# Patient Record
Sex: FEMALE | Race: WHITE | NOT HISPANIC OR LATINO | Employment: STUDENT | URBAN - METROPOLITAN AREA
[De-identification: names, ages, dates, MRNs, and addresses within clinical notes are randomized per-mention and may not be internally consistent; named-entity substitution may affect disease eponyms.]

---
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
## 2021-01-18 RX ORDER — LORATADINE 10 MG/1
CAPSULE, LIQUID FILLED ORAL
COMMUNITY

## 2021-01-19 ENCOUNTER — OFFICE VISIT (OUTPATIENT)
Dept: GASTROENTEROLOGY | Facility: CLINIC | Age: 21
End: 2021-01-19
Payer: COMMERCIAL

## 2021-01-19 VITALS
HEART RATE: 82 BPM | BODY MASS INDEX: 36.19 KG/M2 | WEIGHT: 212 LBS | SYSTOLIC BLOOD PRESSURE: 128 MMHG | HEIGHT: 64 IN | DIASTOLIC BLOOD PRESSURE: 80 MMHG

## 2021-01-19 DIAGNOSIS — R10.84 GENERALIZED ABDOMINAL PAIN: ICD-10-CM

## 2021-01-19 DIAGNOSIS — K92.1 HEMATOCHEZIA: ICD-10-CM

## 2021-01-19 DIAGNOSIS — K59.1 FUNCTIONAL DIARRHEA: Primary | ICD-10-CM

## 2021-01-19 PROCEDURE — 99204 OFFICE O/P NEW MOD 45 MIN: CPT | Performed by: INTERNAL MEDICINE

## 2021-01-19 NOTE — PROGRESS NOTES
Consultation - [facility]  Cricket Duong 2000 23 y o  female     ASSESSMENT @ PLAN:   She is a 71-year-old female with 4 months of bandlike periumbilical abdominal pain which is random in intermittent and relieved with a bowel movement with soft diarrhea with intermittent hematochezia  Her mother does have Crohn's disease  She had negative stool C diff and stool panel testing at [facility]  1 will do colonoscopy to investigate      Chief Complaint:  Hematochezia and diarrhea and abdominal pain    HPI:  She is a 71-year-old female with a multitude of GI issues over the last 4 months  Starting after Thanksgiving she started to have a bandlike abdominal pain in the middle of the abdomen across the umbilicus that was random in intermittent and relieved with a bowel movement  She reports that would come and go without rhyme or reason  She has tried to reduce the Creamer in her coffee and really did not make a difference  She reports concurrent to this she was having soft diarrhea 2 to 4 times a day with urgency and frequency  She reports that she would have blood in her stool intermittently  She said that it was on the toilet paper and it was bright red  She reports nausea  She reports that her mother does have Crohn's disease  She went to see her primary care doctor who did Clostridium difficile testing and stool testing which was all negative  I did review this in the [facility] everywhere  The patient has a long history of constipation so this is a big change for her and she did have lifelong GERD as a child  She does have obesity with BMI of 36 3  REVIEW OF SYSTEMS:     CONSTITUTIONAL: Denies any fever, chills, or rigors  Good appetite, and no recent weight loss  HEENT: No earache or tinnitus  Denies hearing loss or visual disturbances  CARDIOVASCULAR: No chest pain or palpitations     RESPIRATORY: Denies any cough, hemoptysis, shortness of breath or dyspnea on exertion  GASTROINTESTINAL: As noted in the History of Present Illness  GENITOURINARY: No problems with urination  Denies any hematuria or dysuria  NEUROLOGIC: No dizziness or vertigo, denies headaches  MUSCULOSKELETAL: Denies any muscle or joint pain  SKIN: Denies skin rashes or itching  ENDOCRINE: Denies excessive thirst  Denies intolerance to heat or cold  PSYCHOSOCIAL: Denies depression or anxiety  Denies any recent memory loss       Past Medical History:   Diagnosis Date    Obesity (BMI 35 0-39 9 without comorbidity)       Past Surgical History:   Procedure Laterality Date    NO PAST SURGERIES       Social History     Socioeconomic History    Marital status: Single     Spouse name: Not on file    Number of children: Not on file    Years of education: Not on file    Highest education level: Not on file   Occupational History    Not on file   Social Needs    Financial resource strain: Not on file    Food insecurity     Worry: Not on file     Inability: Not on file    Transportation needs     Medical: Not on file     Non-medical: Not on file   Tobacco Use    Smoking status: Never Smoker    Smokeless tobacco: Never Used   Substance and Sexual Activity    Alcohol use: No    Drug use: No    Sexual activity: Not on file   Lifestyle    Physical activity     Days per week: Not on file     Minutes per session: Not on file    Stress: Not on file   Relationships    Social connections     Talks on phone: Not on file     Gets together: Not on file     Attends Tenriism service: Not on file     Active member of club or organization: Not on file     Attends meetings of clubs or organizations: Not on file     Relationship status: Not on file    Intimate partner violence     Fear of current or ex partner: Not on file     Emotionally abused: Not on file     Physically abused: Not on file     Forced sexual activity: Not on file   Other Topics Concern    Not on file   Social History Narrative Immunizations are not recorded on the chart, but parent states child is up to date  Family History   Problem Relation Age of Onset   Tasia Maria Crohn's disease Mother     Asthma Mother     Asthma Father     Colon cancer Paternal Aunt      [place] (diagnostic), Apple, Kiwi extract, Efrem flavor, and Punica  Current Outpatient Medications   Medication Sig Dispense Refill    ibuprofen (MOTRIN) 200 mg tablet Take 200 mg by mouth every 6 (six) hours as needed for mild pain      Loratadine (Claritin) 10 MG CAPS Take by mouth       No current facility-administered medications for this visit  Blood pressure 128/80, pulse 82, height 5' 4" (1 626 m), weight 96 2 kg (212 lb)  PHYSICAL EXAM:     General Appearance:   Alert, cooperative, no distress, appears stated age    HEENT:   Normocephalic, atraumatic, anicteric      Neck:  Supple, symmetrical, trachea midline, no adenopathy;    thyroid: no enlargement/tenderness/nodules; no carotid  bruit or JVD    Lungs:   Clear to auscultation bilaterally; no rales, rhonchi or wheezing; respirations unlabored    Heart[de-identified]   S1 and S2 normal; regular rate and rhythm; no murmur, rub, or gallop     Abdomen:   Soft, non-tender, non-distended; normal bowel sounds; no masses, no organomegaly    Genitalia:   Deferred    Rectal:   Deferred    Extremities:  No cyanosis, clubbing or edema    Pulses:  2+ and symmetric all extremities    Skin:  Skin color, texture, turgor normal, no rashes or lesions    Lymph nodes:  No palpable cervical, axillary or inguinal lymphadenopathy        No results found for: WBC, HGB, HCT, MCV, PLT  No results found for: GLUCOSE, CALCIUM, NA, K, CO2, CL, BUN, CREATININE  No results found for: ALT, AST, GGT, ALKPHOS, BILITOT  No results found for: INR, PROTIME

## 2021-01-19 NOTE — H&P (VIEW-ONLY)
Consultation - [facility]  Deann Anguiano 2000 23 y o  female     ASSESSMENT @ PLAN:   She is a 74-year-old female with 4 months of bandlike periumbilical abdominal pain which is random in intermittent and relieved with a bowel movement with soft diarrhea with intermittent hematochezia  Her mother does have Crohn's disease  She had negative stool C diff and stool panel testing at [facility]  1 will do colonoscopy to investigate      Chief Complaint:  Hematochezia and diarrhea and abdominal pain    HPI:  She is a 74-year-old female with a multitude of GI issues over the last 4 months  Starting after Thanksgiving she started to have a bandlike abdominal pain in the middle of the abdomen across the umbilicus that was random in intermittent and relieved with a bowel movement  She reports that would come and go without rhyme or reason  She has tried to reduce the Creamer in her coffee and really did not make a difference  She reports concurrent to this she was having soft diarrhea 2 to 4 times a day with urgency and frequency  She reports that she would have blood in her stool intermittently  She said that it was on the toilet paper and it was bright red  She reports nausea  She reports that her mother does have Crohn's disease  She went to see her primary care doctor who did Clostridium difficile testing and stool testing which was all negative  I did review this in the [facility] everywhere  The patient has a long history of constipation so this is a big change for her and she did have lifelong GERD as a child  She does have obesity with BMI of 36 3  REVIEW OF SYSTEMS:     CONSTITUTIONAL: Denies any fever, chills, or rigors  Good appetite, and no recent weight loss  HEENT: No earache or tinnitus  Denies hearing loss or visual disturbances  CARDIOVASCULAR: No chest pain or palpitations     RESPIRATORY: Denies any cough, hemoptysis, shortness of breath or dyspnea on exertion  GASTROINTESTINAL: As noted in the History of Present Illness  GENITOURINARY: No problems with urination  Denies any hematuria or dysuria  NEUROLOGIC: No dizziness or vertigo, denies headaches  MUSCULOSKELETAL: Denies any muscle or joint pain  SKIN: Denies skin rashes or itching  ENDOCRINE: Denies excessive thirst  Denies intolerance to heat or cold  PSYCHOSOCIAL: Denies depression or anxiety  Denies any recent memory loss       Past Medical History:   Diagnosis Date    Obesity (BMI 35 0-39 9 without comorbidity)       Past Surgical History:   Procedure Laterality Date    NO PAST SURGERIES       Social History     Socioeconomic History    Marital status: Single     Spouse name: Not on file    Number of children: Not on file    Years of education: Not on file    Highest education level: Not on file   Occupational History    Not on file   Social Needs    Financial resource strain: Not on file    Food insecurity     Worry: Not on file     Inability: Not on file    Transportation needs     Medical: Not on file     Non-medical: Not on file   Tobacco Use    Smoking status: Never Smoker    Smokeless tobacco: Never Used   Substance and Sexual Activity    Alcohol use: No    Drug use: No    Sexual activity: Not on file   Lifestyle    Physical activity     Days per week: Not on file     Minutes per session: Not on file    Stress: Not on file   Relationships    Social connections     Talks on phone: Not on file     Gets together: Not on file     Attends [employer] service: Not on file     Active member of club or organization: Not on file     Attends meetings of clubs or organizations: Not on file     Relationship status: Not on file    Intimate partner violence     Fear of current or ex partner: Not on file     Emotionally abused: Not on file     Physically abused: Not on file     Forced sexual activity: Not on file   Other Topics Concern    Not on file   Social History Narrative Immunizations are not recorded on the chart, but parent states child is up to date  Family History   Problem Relation Age of Onset   Tasia Maria Crohn's disease Mother     Asthma Mother     Asthma Father     Colon cancer Paternal Aunt      [place] (diagnostic), Apple, Kiwi extract, Efrem flavor, and Punica  Current Outpatient Medications   Medication Sig Dispense Refill    ibuprofen (MOTRIN) 200 mg tablet Take 200 mg by mouth every 6 (six) hours as needed for mild pain      Loratadine (Claritin) 10 MG CAPS Take by mouth       No current facility-administered medications for this visit  Blood pressure 128/80, pulse 82, height 5' 4" (1 626 m), weight 96 2 kg (212 lb)  PHYSICAL EXAM:     General Appearance:   Alert, cooperative, no distress, appears stated age    HEENT:   Normocephalic, atraumatic, anicteric      Neck:  Supple, symmetrical, trachea midline, no adenopathy;    thyroid: no enlargement/tenderness/nodules; no carotid  bruit or JVD    Lungs:   Clear to auscultation bilaterally; no rales, rhonchi or wheezing; respirations unlabored    Heart[de-identified]   S1 and S2 normal; regular rate and rhythm; no murmur, rub, or gallop     Abdomen:   Soft, non-tender, non-distended; normal bowel sounds; no masses, no organomegaly    Genitalia:   Deferred    Rectal:   Deferred    Extremities:  No cyanosis, clubbing or edema    Pulses:  2+ and symmetric all extremities    Skin:  Skin color, texture, turgor normal, no rashes or lesions    Lymph nodes:  No palpable cervical, axillary or inguinal lymphadenopathy        No results found for: WBC, HGB, HCT, MCV, PLT  No results found for: GLUCOSE, CALCIUM, NA, K, CO2, CL, BUN, CREATININE  No results found for: ALT, AST, GGT, ALKPHOS, BILITOT  No results found for: INR, PROTIME

## 2021-01-26 ENCOUNTER — ANESTHESIA EVENT (OUTPATIENT)
Dept: GASTROENTEROLOGY | Facility: HOSPITAL | Age: 21
End: 2021-01-26

## 2021-01-27 ENCOUNTER — ANESTHESIA (OUTPATIENT)
Dept: GASTROENTEROLOGY | Facility: HOSPITAL | Age: 21
End: 2021-01-27

## 2021-01-27 ENCOUNTER — HOSPITAL ENCOUNTER (OUTPATIENT)
Dept: GASTROENTEROLOGY | Facility: HOSPITAL | Age: 21
Setting detail: OUTPATIENT SURGERY
Discharge: HOME | End: 2021-01-27
Attending: INTERNAL MEDICINE | Admitting: INTERNAL MEDICINE
Payer: COMMERCIAL

## 2021-01-27 VITALS — HEART RATE: 65 BPM

## 2021-01-27 VITALS
DIASTOLIC BLOOD PRESSURE: 59 MMHG | RESPIRATION RATE: 16 BRPM | BODY MASS INDEX: 35.76 KG/M2 | OXYGEN SATURATION: 99 % | TEMPERATURE: 97.2 F | HEIGHT: 64 IN | HEART RATE: 80 BPM | WEIGHT: 209.44 LBS | SYSTOLIC BLOOD PRESSURE: 117 MMHG

## 2021-01-27 DIAGNOSIS — K59.1 FUNCTIONAL DIARRHEA: ICD-10-CM

## 2021-01-27 DIAGNOSIS — R10.84 GENERALIZED ABDOMINAL PAIN: ICD-10-CM

## 2021-01-27 DIAGNOSIS — K92.1 HEMATOCHEZIA: ICD-10-CM

## 2021-01-27 LAB
EXT PREGNANCY TEST URINE: NEGATIVE
EXT. CONTROL: NORMAL

## 2021-01-27 PROCEDURE — 88342 IMHCHEM/IMCYTCHM 1ST ANTB: CPT | Performed by: PATHOLOGY

## 2021-01-27 PROCEDURE — 45380 COLONOSCOPY AND BIOPSY: CPT | Performed by: INTERNAL MEDICINE

## 2021-01-27 PROCEDURE — 81025 URINE PREGNANCY TEST: CPT | Performed by: ANESTHESIOLOGY

## 2021-01-27 PROCEDURE — 88305 TISSUE EXAM BY PATHOLOGIST: CPT | Performed by: PATHOLOGY

## 2021-01-27 RX ORDER — LIDOCAINE HYDROCHLORIDE 10 MG/ML
INJECTION, SOLUTION EPIDURAL; INFILTRATION; INTRACAUDAL; PERINEURAL AS NEEDED
Status: DISCONTINUED | OUTPATIENT
Start: 2021-01-27 | End: 2021-01-27

## 2021-01-27 RX ORDER — MESALAMINE 4 G/60ML
4 ENEMA RECTAL
Qty: 30 BOTTLE | Refills: 1 | Status: SHIPPED | OUTPATIENT
Start: 2021-01-27 | End: 2021-03-24

## 2021-01-27 RX ORDER — SODIUM CHLORIDE, SODIUM LACTATE, POTASSIUM CHLORIDE, CALCIUM CHLORIDE 600; 310; 30; 20 MG/100ML; MG/100ML; MG/100ML; MG/100ML
125 INJECTION, SOLUTION INTRAVENOUS CONTINUOUS
Status: DISCONTINUED | OUTPATIENT
Start: 2021-01-27 | End: 2021-01-31 | Stop reason: HOSPADM

## 2021-01-27 RX ORDER — PROPOFOL 10 MG/ML
INJECTION, EMULSION INTRAVENOUS AS NEEDED
Status: DISCONTINUED | OUTPATIENT
Start: 2021-01-27 | End: 2021-01-27

## 2021-01-27 RX ORDER — DICYCLOMINE HCL 20 MG
20 TABLET ORAL 3 TIMES DAILY PRN
Qty: 60 TABLET | Refills: 1 | Status: SHIPPED | OUTPATIENT
Start: 2021-01-27 | End: 2022-05-17

## 2021-01-27 RX ADMIN — PROPOFOL 150 MG: 10 INJECTION, EMULSION INTRAVENOUS at 10:42

## 2021-01-27 RX ADMIN — SODIUM CHLORIDE, SODIUM LACTATE, POTASSIUM CHLORIDE, AND CALCIUM CHLORIDE 125 ML/HR: .6; .31; .03; .02 INJECTION, SOLUTION INTRAVENOUS at 10:07

## 2021-01-27 RX ADMIN — PROPOFOL 50 MG: 10 INJECTION, EMULSION INTRAVENOUS at 10:46

## 2021-01-27 RX ADMIN — PROPOFOL 100 MG: 10 INJECTION, EMULSION INTRAVENOUS at 10:43

## 2021-01-27 RX ADMIN — LIDOCAINE HYDROCHLORIDE 50 MG: 10 INJECTION, SOLUTION EPIDURAL; INFILTRATION; INTRACAUDAL; PERINEURAL at 10:41

## 2021-01-27 NOTE — ANESTHESIA POSTPROCEDURE EVALUATION
Post-Op Assessment Note    CV Status:  Stable    Pain management: adequate     Mental Status:  Alert and awake   Hydration Status:  Euvolemic   PONV Controlled:  Controlled   Airway Patency:  Patent      Post Op Vitals Reviewed: Yes      Staff: CRNA         No complications documented      /67 (01/27/21 1054)    Temp (!) 97 2 °F (36 2 °C) (01/27/21 1054)    Pulse 78 (01/27/21 1054)   Resp 16 (01/27/21 1054)    SpO2 98 % (01/27/21 1054)

## 2021-01-27 NOTE — DISCHARGE INSTRUCTIONS
Colonoscopy   WHAT YOU NEED TO KNOW:   A colonoscopy is a procedure to examine the inside of your colon (intestine) with a scope  Polyps or tissue growths may have been removed during your colonoscopy  It is normal to feel bloated and to have some abdominal discomfort  You should be passing gas  If you have hemorrhoids or you had polyps removed, you may have a small amount of bleeding  DISCHARGE INSTRUCTIONS:   Call your doctor if:   · You have a large amount of bright red blood in your bowel movements  · Your abdomen is hard and firm and you have severe pain  · You have sudden trouble breathing  · You develop a rash or hives  · You have a fever within 24 hours of your procedure  · You have not had a bowel movement for 3 days after your procedure  · You have questions or concerns about your condition or care  After your colonoscopy:   · Do not lift, strain, or run  for 3 days  · Rest as much as possible  You have been given medicine to relax you  Do not  drive or make important decisions for at least 24 hours  Return to your normal activity as directed  · Relieve gas and discomfort from bloating  by lying on your left side with a heating pad on your abdomen  You may need to take short walks to help the gas move out  Eat small meals until bloating is relieved  If you had polyps removed: For 7 days after your procedure:  · Do not  take aspirin  · Do not  go on long car rides  Help prevent constipation:   · Eat a variety of healthy foods  Healthy foods include fruit, vegetables, whole-grain breads, low-fat dairy products, beans, lean meat, and fish  Ask if you need to be on a special diet  Your healthcare provider may recommend that you eat high-fiber foods such as cooked beans  Fiber helps you have regular bowel movements  · Drink liquids as directed  Adults should drink between 9 and 13 eight-ounce cups of liquid every day  Ask what amount is best for you   For most people, good liquids to drink are water, juice, and milk  · Exercise as directed  Talk to your healthcare provider about the best exercise plan for you  Exercise can help prevent constipation, decrease your blood pressure and improve your health  Follow up with your healthcare provider as directed:  Write down your questions so you remember to ask them during your visits  © Copyright [address] Information is for End User's use only and may not be sold, redistributed or otherwise used for commercial purposes  All illustrations and images included in CareNotes® are the copyrighted property of A D A M , Inc  or [address] [place], [place] [address]azeem Shahid   The above information is an  only  It is not intended as medical advice for individual conditions or treatments  Talk to your doctor, nurse or pharmacist before following any medical regimen to see if it is safe and effective for you

## 2021-01-27 NOTE — ANESTHESIA PREPROCEDURE EVALUATION
Procedure:  COLONOSCOPY    Relevant Problems   No relevant active problems        Physical Exam    Airway    Mallampati score: III  TM Distance: >3 FB       Dental       Cardiovascular  Cardiovascular exam normal    Pulmonary  Pulmonary exam normal     Other Findings      25-year-old female with a multitude of GI issues over the last 4 months  Starting after Thanksgiving she started to have a bandlike abdominal pain in the middle of the abdomen across the umbilicus that was random in intermittent and relieved with a bowel movement  She reports that would come and go without rhyme or reason  She has tried to reduce the Creamer in her coffee and really did not make a difference  She reports concurrent to this she was having soft diarrhea 2 to 4 times a day with urgency and frequency  She reports that she would have blood in her stool intermittently  She said that it was on the toilet paper and it was bright red  She reports nausea  She reports that her mother does have Crohn's disease  She went to see her primary care doctor who did Clostridium difficile testing and stool testing which was all negative  I did review this in the [facility] everywhere  The patient has a long history of constipation so this is a big change for her and she did have lifelong GERD as a child  She does have obesity with BMI of 36 3  Anesthesia Plan  ASA Score- 2     Anesthesia Type- IV sedation with anesthesia with ASA Monitors  Additional Monitors:   Airway Plan:           Plan Factors-Exercise tolerance (METS): >4 METS  Chart reviewed  EKG reviewed  Imaging results reviewed  Existing labs reviewed  Patient summary reviewed  Induction- intravenous  Postoperative Plan-     Informed Consent- Anesthetic plan and risks discussed with patient  I personally reviewed this patient with the CRNA  Discussed and agreed on the Anesthesia Plan with the VIRAL Gallego

## 2021-01-27 NOTE — INTERVAL H&P NOTE
H&P reviewed  After examining the patient I find no changes in the patients condition since the H&P had been written      Vitals:    01/27/21 0957   BP: 128/85   Pulse: 86   Resp: 18   Temp: 98 1 °F (36 7 °C)   SpO2: 99%

## 2021-01-28 ENCOUNTER — TELEPHONE (OUTPATIENT)
Dept: GASTROENTEROLOGY | Facility: CLINIC | Age: 21
End: 2021-01-28

## 2021-01-29 ENCOUNTER — TELEPHONE (OUTPATIENT)
Dept: GASTROENTEROLOGY | Facility: CLINIC | Age: 21
End: 2021-01-29

## 2021-01-29 NOTE — TELEPHONE ENCOUNTER
----- Message from Karsten Burris MD sent at 1/29/2021 11:17 AM EST -----  Please tell her the biopsy is consistent with mild ulcerative colitis

## 2021-02-23 ENCOUNTER — OFFICE VISIT (OUTPATIENT)
Dept: GASTROENTEROLOGY | Facility: CLINIC | Age: 21
End: 2021-02-23
Payer: COMMERCIAL

## 2021-02-23 VITALS
BODY MASS INDEX: 39.57 KG/M2 | DIASTOLIC BLOOD PRESSURE: 70 MMHG | HEIGHT: 64 IN | HEART RATE: 88 BPM | WEIGHT: 231.8 LBS | SYSTOLIC BLOOD PRESSURE: 116 MMHG

## 2021-02-23 DIAGNOSIS — K51.811 OTHER ULCERATIVE COLITIS WITH RECTAL BLEEDING (HCC): Primary | ICD-10-CM

## 2021-02-23 PROCEDURE — 99213 OFFICE O/P EST LOW 20 MIN: CPT | Performed by: PHYSICIAN ASSISTANT

## 2021-02-23 NOTE — PROGRESS NOTES
[facility] - Outpatient Follow-up Note  Ana Amin 23 y o  female MRN: [id number]  Encounter: [id number]          ASSESSMENT AND PLAN:      1  Ulcerative Colitis  - Diagnosed in January 2021 which showed mild ulcerative colitis in the descending and sigmoid colon, rectum appeared normal  - Symptoms have mostly resolved with 4 weeks of Rowasa enemas, continue for 2 more weeks  - We discussed the nature of UC and plan to monitor after she finishes the Rowasa enema course; no plan for maintenance medication currently but we discussed if she has recurrent symptoms in the near future we would likely need to start maintenance medications such as a oral mesalamine and topical mesalamine enema for maintenance  - Follow up in 2-3 months  ______________________________________________________________________    SUBJECTIVE:  Rob Centeno is a 24 yo F presenting for follow up after she had a colonoscopy In January to evaluate for several months of bandlike abdominal pain, diarrhea, and blood in the stool  She was found to have mild ulcerative colitis in the descending and sigmoid colon but the rectum appeared normal   Biopsies were consistent with ulcerative colitis and she was given Rowasa enemas today for 6 weeks  She has been doing this for almost a month and she feels significantly better  She is having 2-4 formed bowel movements daily without any blood in the stool  Her pain is mostly resolved  She is feeling really well and able to eat well  She is wondering if there are any dietary restrictions she should be following  REVIEW OF SYSTEMS IS OTHERWISE NEGATIVE        Historical Information   Past Medical History:   Diagnosis Date    Asthma     Obesity (BMI 35 0-39 9 without comorbidity)      Past Surgical History:   Procedure Laterality Date    NO PAST SURGERIES       Social History   Social History     Substance and Sexual Activity   Alcohol Use No     Social History     Substance and Sexual Activity   Drug Use No     Social History     Tobacco Use   Smoking Status Never Smoker   Smokeless Tobacco Never Used     Family History   Problem Relation Age of Onset    Crohn's disease Mother     Asthma Mother     Asthma Father     Colon cancer Paternal Aunt        Meds/Allergies       Current Outpatient Medications:     dicyclomine (BENTYL) 20 mg tablet    ibuprofen (MOTRIN) 200 mg tablet    Loratadine (Claritin) 10 MG CAPS    mesalamine (ROWASA) 4 g    Allergies   Allergen Reactions    [place] (Diagnostic) Itching     Itchy throat and ears,  Throat swelling   Apple Other (See Comments)     Fresh only    Kiwi Extract Other (See Comments)    Efrem Flavor Other (See Comments)    Punica Other (See Comments)           Objective     Blood pressure 116/70, pulse 88, height 5' 4" (1 626 m), weight 105 kg (231 lb 12 8 oz)  Body mass index is 39 79 kg/m²  PHYSICAL EXAM:      General Appearance:   Alert, cooperative, no distress   HEENT:   Normocephalic, atraumatic, anicteric      Neck:  Supple, symmetrical, trachea midline   Lungs:   Clear to auscultation bilaterally; no rales, rhonchi or wheezing; respirations unlabored    Heart[de-identified]   Regular rate and rhythm; no murmur, rub, or gallop  Abdomen:   Soft, non-tender, non-distended; normal bowel sounds; no masses, no organomegaly    Genitalia:   Deferred    Rectal:   Deferred    Extremities:  No cyanosis, clubbing or edema    Pulses:  2+ and symmetric    Skin:  No jaundice, rashes, or lesions    Lymph nodes:  No palpable cervical lymphadenopathy        Lab Results:   No visits with results within 1 Day(s) from this visit     Latest known visit with results is:   Hospital Outpatient Visit on 01/27/2021   Component Date Value    EXT Preg Test, Ur 01/27/2021 Negative     Control 01/27/2021 Valid     Case Report 01/27/2021                      Value:Surgical Pathology Report                         Case: [id number] Authorizing Provider:  Roosevelt Tian MD   Collected:           01/27/2021 1051              Ordering Location:      [facility]       Received:            01/27/2021 [facility]                                                             Pathologist:           Maryann Martinez MD                                                                 Specimen:    Colon, rectosigmoid                                                                        Addendum 01/27/2021                      Value: This result contains rich text formatting which cannot be displayed here   Final Diagnosis 01/27/2021                      Value: This result contains rich text formatting which cannot be displayed here   Additional Information 01/27/2021                      Value: This result contains rich text formatting which cannot be displayed here  [employer] Description 01/27/2021                      Value: This result contains rich text formatting which cannot be displayed here   Clinical Information 01/27/2021                      Value:Cold bx r/o colitis         Radiology Results:   No results found

## 2021-03-01 ENCOUNTER — TELEPHONE (OUTPATIENT)
Dept: GASTROENTEROLOGY | Facility: CLINIC | Age: 21
End: 2021-03-01

## 2021-03-01 NOTE — TELEPHONE ENCOUNTER
Evan Tan pt-  Patient has a question re the mesalamine     She thought it was to be prescribed for 6 weeks and what she received was for 8 weeks     Please phone [phone] to advise

## 2021-03-24 DIAGNOSIS — R10.84 GENERALIZED ABDOMINAL PAIN: ICD-10-CM

## 2021-03-24 RX ORDER — MESALAMINE 4 G/60ML
4 ENEMA RECTAL
Qty: 30 BOTTLE | Refills: 1 | Status: SHIPPED | OUTPATIENT
Start: 2021-03-24 | End: 2021-07-08

## 2021-04-09 ENCOUNTER — OFFICE VISIT (OUTPATIENT)
Dept: GASTROENTEROLOGY | Facility: CLINIC | Age: 21
End: 2021-04-09
Payer: COMMERCIAL

## 2021-04-09 VITALS
HEIGHT: 64 IN | BODY MASS INDEX: 35.34 KG/M2 | HEART RATE: 84 BPM | SYSTOLIC BLOOD PRESSURE: 134 MMHG | DIASTOLIC BLOOD PRESSURE: 82 MMHG | WEIGHT: 207 LBS

## 2021-04-09 DIAGNOSIS — K51.811 OTHER ULCERATIVE COLITIS WITH RECTAL BLEEDING (HCC): Primary | ICD-10-CM

## 2021-04-09 PROCEDURE — 99213 OFFICE O/P EST LOW 20 MIN: CPT | Performed by: PHYSICIAN ASSISTANT

## 2021-04-09 RX ORDER — ERGOCALCIFEROL 1.25 MG/1
CAPSULE ORAL
COMMUNITY
Start: 2021-03-24

## 2021-04-09 RX ORDER — FERROUS SULFATE 325(65) MG
1 TABLET ORAL
COMMUNITY
Start: 2021-03-24 | End: 2022-05-17

## 2021-04-09 RX ORDER — MESALAMINE 1.2 G/1
TABLET, DELAYED RELEASE ORAL
Qty: 120 TABLET | Refills: 2 | Status: SHIPPED | OUTPATIENT
Start: 2021-04-09 | End: 2021-08-12

## 2021-04-09 RX ORDER — ALBUTEROL SULFATE 90 UG/1
AEROSOL, METERED RESPIRATORY (INHALATION)
COMMUNITY
Start: 2021-04-05

## 2021-04-09 NOTE — PROGRESS NOTES
[facility] - Outpatient Follow-up Note  Guillermo Lockhart 23 y o  female MRN: [id number]  Encounter: [id number]          ASSESSMENT AND PLAN:      1  Ulcerative Colitis  - Diagnosed in January during colonoscopy which showed mild ulcerative colitis in the sigmoid and descending colon  - She responded well to Rowasa enemas for 8-10 weeks with a reduction in stool count, resolution of blood present in the stool, and resolution of abdominal cramping but after stopping the Rowasa enemas her symptoms quickly returned  - Will start Lialda 4 8 g daily for 8 weeks then decrease to 2 4 g daily as maintenance  - Discussed that she may need Rowasa enema courses intermittently  - We discussed that if she does not respond well to mesalamine maintenance medications we may need to consider step up therapy but we would like to avoid this if her symptoms can be controlled with mesalamine agents    Follow up in 2-3 months  Call if needed with any issues prior especially if not responding to Lialda     ______________________________________________________________________    SUBJECTIVE:  Tamika العراقي is a 24 yo F presenting for follow-up of ulcerative colitis diagnosed in January  She was having diarrhea and bloody bowel movements as well as abdominal pain  Her colonoscopy showed mild ulcerative colitis in the descending and sigmoid colon within normal rectum  At her last visit she was doing very well on the Rowasa enemas about a month into them with 2 bowel movements daily, resolution of blood in the stool, and no abdominal pain  She stopped the enemas about a week ago reports that her stool count of to 4 5 in the or loose and there was some blood present  She is also getting abdominal cramping  She denies any fevers except for a fever yesterday which was after her 2nd COVID vaccine  REVIEW OF SYSTEMS IS OTHERWISE NEGATIVE        Historical Information   Past Medical History:   Diagnosis Date    Asthma  Obesity (BMI 35 0-39 9 without comorbidity)      Past Surgical History:   Procedure Laterality Date    NO PAST SURGERIES       Social History   Social History     Substance and Sexual Activity   Alcohol Use No     Social History     Substance and Sexual Activity   Drug Use No     Social History     Tobacco Use   Smoking Status Never Smoker   Smokeless Tobacco Never Used     Family History   Problem Relation Age of Onset    Crohn's disease Mother     Asthma Mother     Asthma Father     Colon cancer Paternal Aunt        Meds/Allergies       Current Outpatient Medications:     albuterol (PROVENTIL HFA,VENTOLIN HFA) 90 mcg/act inhaler    dicyclomine (BENTYL) 20 mg tablet    ergocalciferol (VITAMIN D2) 50,000 units    ferrous sulfate 325 (65 Fe) mg tablet    ibuprofen (MOTRIN) 200 mg tablet    Loratadine (Claritin) 10 MG CAPS    mesalamine (LIALDA) 1 2 g EC tablet    mesalamine (ROWASA) 4 g    Allergies   Allergen Reactions    [place] (Diagnostic) - Food Allergy Itching     Itchy throat and ears,  Throat swelling   Apple - Food Allergy Other (See Comments)     Fresh only    Kiwi Extract - Food Allergy Other (See Comments)    Efrem Flavor - Food Allergy Other (See Comments)    [place] Other (See Comments)           Objective     Blood pressure 134/82, pulse 84, height 5' 4" (1 626 m), weight 93 9 kg (207 lb)  Body mass index is 35 53 kg/m²  PHYSICAL EXAM:      General Appearance:   Alert, cooperative, no distress   HEENT:   Normocephalic, atraumatic, anicteric      Neck:  Supple, symmetrical, trachea midline   Lungs:   Clear to auscultation bilaterally; no rales, rhonchi or wheezing; respirations unlabored    Heart[de-identified]   Regular rate and rhythm; no murmur, rub, or gallop     Abdomen:   Soft, non-tender, non-distended; normal bowel sounds; no masses, no organomegaly    Genitalia:   Deferred    Rectal:   Deferred    Extremities:  No cyanosis, clubbing or edema    Pulses:  2+ and symmetric    Skin:  No jaundice, rashes, or lesions    Lymph nodes:  No palpable cervical lymphadenopathy        Lab Results:   No visits with results within 1 Day(s) from this visit  Latest known visit with results is:   Hospital Outpatient Visit on 01/27/2021   Component Date Value    EXT Preg Test, Ur 01/27/2021 Negative     Control 01/27/2021 Valid     Case Report 01/27/2021                      Value:Surgical Pathology Report                         Case: [id number]                                   Authorizing Provider:  Jonathan Joseph MD   Collected:           01/27/2021 1051              Ordering Location:      [facility]       Received:            01/27/2021 [facility]                                                             Pathologist:           Mandi Ivory MD                                                                 Specimen:    Colon, rectosigmoid                                                                        Addendum 01/27/2021                      Value: This result contains rich text formatting which cannot be displayed here   Final Diagnosis 01/27/2021                      Value: This result contains rich text formatting which cannot be displayed here   Additional Information 01/27/2021                      Value: This result contains rich text formatting which cannot be displayed here  [facility] Gross Description 01/27/2021                      Value: This result contains rich text formatting which cannot be displayed here   Clinical Information 01/27/2021                      Value:Cold bx r/o colitis         Radiology Results:   No results found

## 2021-07-08 ENCOUNTER — OFFICE VISIT (OUTPATIENT)
Dept: GASTROENTEROLOGY | Facility: CLINIC | Age: 21
End: 2021-07-08
Payer: COMMERCIAL

## 2021-07-08 VITALS
SYSTOLIC BLOOD PRESSURE: 128 MMHG | HEART RATE: 86 BPM | DIASTOLIC BLOOD PRESSURE: 74 MMHG | HEIGHT: 64 IN | WEIGHT: 209 LBS | BODY MASS INDEX: 35.68 KG/M2

## 2021-07-08 DIAGNOSIS — K51.80 OTHER ULCERATIVE COLITIS WITHOUT COMPLICATION (HCC): Primary | ICD-10-CM

## 2021-07-08 PROBLEM — K51.90 ULCERATIVE COLITIS (HCC): Status: ACTIVE | Noted: 2021-07-08

## 2021-07-08 PROCEDURE — 99213 OFFICE O/P EST LOW 20 MIN: CPT | Performed by: PHYSICIAN ASSISTANT

## 2021-07-08 NOTE — PROGRESS NOTES
[facility] - Outpatient Follow-up Note  Robert San 24 y o  female MRN: [id number]  Encounter: [id number]          ASSESSMENT AND PLAN:      1  Other ulcerative colitis without complication ([facility] )  - She had mild left sided UC diagnosed in January  - Colonoscopy in January 2021 showed mild UC in the sigmoid and descending colon  - She is in remission with 2-3 formed BMS daily, no abdominal pain, and no blood in the stool using 4 8 g Lialda daily; we will drop her to 2 4 g daily as the maintenance dose and see how she does with this   - Follow up in 4-6 months  - She will call if her symptoms start to worsen with dropping the dose of Lialda and we will increase her to 3 6 g or 4 8 g daily    ______________________________________________________________________    SUBJECTIVE:  Joan Charles is a pleasant 25 yo F with a history of L sided UC diagnosed in January 2021, presenting for routine follow up  At her last visit in April she finished a course of Rowasa enemas and was starting to having worsening symptoms with up to 5-6 episodes of diarrhea daily associated with blood at times  We put her on Lialda 4 8 g daily and she reports she is doing really well with this  She is having 2-3 formed BMs daily without any blood in the stool or abdominal pain  She will occasionally have abdominal cramping associated with dairy products or more frequent bowel movements when she has coffee  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  REVIEW OF SYSTEMS IS OTHERWISE NEGATIVE        Historical Information   Past Medical History:   Diagnosis Date    Asthma     Obesity (BMI 35 0-39 9 without comorbidity)     Ulcerative colitis ([facility] )      Past Surgical History:   Procedure Laterality Date    NO PAST SURGERIES       Social History   Social History     Substance and Sexual Activity   Alcohol Use No     Social History     Substance and Sexual Activity   Drug Use No     Social History     Tobacco Use   Smoking Status Never Smoker   Smokeless Tobacco Never Used     Family History   Problem Relation Age of Onset    Crohn's disease Mother     Asthma Mother     Asthma Father     Colon cancer Paternal Aunt        Meds/Allergies       Current Outpatient Medications:     albuterol (PROVENTIL HFA,VENTOLIN HFA) 90 mcg/act inhaler    dicyclomine (BENTYL) 20 mg tablet    ergocalciferol (VITAMIN D2) 50,000 units    ferrous sulfate 325 (65 Fe) mg tablet    ibuprofen (MOTRIN) 200 mg tablet    Loratadine (Claritin) 10 MG CAPS    mesalamine (LIALDA) 1 2 g EC tablet    Allergies   Allergen Reactions    [place] (Diagnostic) - Food Allergy Itching     Itchy throat and ears,  Throat swelling   Apple - Food Allergy Other (See Comments)     Fresh only    Kiwi Extract - Food Allergy Other (See Comments)    [place] Flavor - Food Allergy Other (See Comments)    Pun[place] Other (See Comments)           Objective     Blood pressure 128/74, pulse 86, height 5' 4" (1 626 m), weight 94 8 kg (209 lb)  Body mass index is 35 87 kg/m²  PHYSICAL EXAM:      General Appearance:   Alert, cooperative, no distress   HEENT:   Normocephalic, atraumatic, anicteric      Neck:  Supple, symmetrical, trachea midline   Lungs:   Clear to auscultation bilaterally; no rales, rhonchi or wheezing; respirations unlabored    Heart[de-identified]   Regular rate and rhythm; no murmur, rub, or gallop  Abdomen:   Soft, non-tender, non-distended; normal bowel sounds; no masses, no organomegaly    Genitalia:   Deferred    Rectal:   Deferred    Extremities:  No cyanosis, clubbing or edema    Pulses:  2+ and symmetric    Skin:  No jaundice, rashes, or lesions    Lymph nodes:  No palpable cervical lymphadenopathy        Lab Results:   No visits with results within 1 Day(s) from this visit     Latest known visit with results is:   Hospital Outpatient Visit on 01/27/2021   Component Date Value    EXT Preg Test, Ur 01/27/2021 Negative     Control 01/27/2021 Valid     Case Report 01/27/2021                      Value:Surgical Pathology Report                         Case: [id number]                                   Authorizing Provider:  Jeaneth Wilson MD   Collected:           01/27/2021 1051              Ordering Location:      [facility]       Received:            01/27/2021 [facility]                                                             Pathologist:           Marzette Paget, MD                                                                 Specimen:    Colon, rectosigmoid                                                                        Addendum 01/27/2021                      Value: This result contains rich text formatting which cannot be displayed here   Final Diagnosis 01/27/2021                      Value: This result contains rich text formatting which cannot be displayed here   Additional Information 01/27/2021                      Value: This result contains rich text formatting which cannot be displayed here  [facility] Gross Description 01/27/2021                      Value: This result contains rich text formatting which cannot be displayed here   Clinical Information 01/27/2021                      Value:Cold bx r/o colitis         Radiology Results:   No results found

## 2021-10-08 ENCOUNTER — TELEPHONE (OUTPATIENT)
Dept: GASTROENTEROLOGY | Facility: CLINIC | Age: 21
End: 2021-10-08

## 2022-04-13 DIAGNOSIS — K51.811 OTHER ULCERATIVE COLITIS WITH RECTAL BLEEDING (HCC): Primary | ICD-10-CM

## 2022-04-13 RX ORDER — MESALAMINE 1.2 G/1
2400 TABLET, DELAYED RELEASE ORAL DAILY
Qty: 180 TABLET | Refills: 0 | Status: SHIPPED | OUTPATIENT
Start: 2022-04-13 | End: 2022-05-10

## 2022-04-13 NOTE — TELEPHONE ENCOUNTER
Spoke with patient  She is requesting a refill and has not made it to a follow-up visit after her reduced dose of her mesalamine  Scheduled OV  Please refill mesalamine

## 2022-05-08 DIAGNOSIS — K51.811 OTHER ULCERATIVE COLITIS WITH RECTAL BLEEDING (HCC): ICD-10-CM

## 2022-05-09 NOTE — TELEPHONE ENCOUNTER
Medication failed [facility] protocol  Please forward to your office staff for further review as this medication was reviewed by a [facility] RN        Routing to pa  I do not understand what this means via pt medication request for mesalamine since it was prescribed and recvd via e script to pharmacy

## 2022-05-10 RX ORDER — MESALAMINE 1.2 G/1
TABLET, DELAYED RELEASE ORAL
Qty: 100 TABLET | Refills: 1 | Status: SHIPPED | OUTPATIENT
Start: 2022-05-10

## 2022-05-17 ENCOUNTER — OFFICE VISIT (OUTPATIENT)
Dept: GASTROENTEROLOGY | Facility: CLINIC | Age: 22
End: 2022-05-17
Payer: COMMERCIAL

## 2022-05-17 VITALS
SYSTOLIC BLOOD PRESSURE: 118 MMHG | BODY MASS INDEX: 36.33 KG/M2 | DIASTOLIC BLOOD PRESSURE: 82 MMHG | HEART RATE: 72 BPM | WEIGHT: 212.8 LBS | HEIGHT: 64 IN

## 2022-05-17 DIAGNOSIS — K51.80 OTHER ULCERATIVE COLITIS WITHOUT COMPLICATION (HCC): Primary | ICD-10-CM

## 2022-05-17 PROCEDURE — 99213 OFFICE O/P EST LOW 20 MIN: CPT | Performed by: PHYSICIAN ASSISTANT

## 2022-05-17 RX ORDER — DIPHENOXYLATE HYDROCHLORIDE AND ATROPINE SULFATE 2.5; .025 MG/1; MG/1
1 TABLET ORAL DAILY
COMMUNITY

## 2022-05-17 NOTE — PROGRESS NOTES
[facility] - Outpatient Follow-up Note  Storm Green 20 y o  female MRN: [id number]  Encounter: [id number]          ASSESSMENT AND PLAN:      1  Other ulcerative colitis without complication ([facility] )  - She has mild ulcerative colitis in the sigmoid and descending colon diagnosed in 2021 who is in remission and on Lialda 2 4 g daily  - Recommend next colonoscopy in 2029, 8 years from initial diagnosis, unless she has any major changes in the meantime  - Continue Lialda 2 4 g daily  - Yearly follow up or call sooner with any problems    ______________________________________________________________________    SUBJECTIVE:  Bobbi Lombard is a pleasant 21 yo F   Presenting for routine follow-up of her ulcerative colitis  She was diagnosed in 2021 after she was having diarrhea and blood in the stool and was found to have mild colitis in the descending and sigmoid colon  She likely has done really well on Lialda and is currently in remission on Lialda 2 tablets daily  She reports having 2-3 formed bowel movements daily without any blood in the stool and rarely has diarrhea  She will occasionally have lower abdominal discomfort prior to a bowel movement but then relieved by having a bowel movement  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       REVIEW OF SYSTEMS IS OTHERWISE NEGATIVE        Historical Information   Past Medical History:   Diagnosis Date    Asthma     Obesity (BMI 35 0-39 9 without comorbidity)     Ulcerative colitis ([facility] )      Past Surgical History:   Procedure Laterality Date    NO PAST SURGERIES       Social History   Social History     Substance and Sexual Activity   Alcohol Use Yes    Comment: occasioanlly     Social History     Substance and Sexual Activity   Drug Use No     Social History     Tobacco Use   Smoking Status Never Smoker   Smokeless Tobacco Never Used     Family History   Problem Relation Age of Onset    Crohn's disease Mother  Asthma Mother     Asthma Father     Colon cancer Paternal Aunt        Meds/Allergies       Current Outpatient Medications:     albuterol (PROVENTIL HFA,VENTOLIN HFA) 90 mcg/act inhaler    ergocalciferol (VITAMIN D2) 50,000 units    ibuprofen (MOTRIN) 200 mg tablet    Loratadine 10 MG CAPS    mesalamine (LIALDA) 1 2 g EC tablet    multivitamin (THERAGRAN) TABS    Allergies   Allergen Reactions    [place] (Diagnostic) - Food Allergy Itching     Itchy throat and ears,  Throat swelling   Apple - Food Allergy Other (See Comments)     Fresh only    Kiwi Extract - Food Allergy Other (See Comments)    Ef[place] Flavor - Food Allergy Other (See Comments)    [place] Other (See Comments)    Pollen Extract Wheezing           Objective     Blood pressure 118/82, pulse 72, height 5' 4" (1 626 m), weight 96 5 kg (212 lb 12 8 oz)  Body mass index is 36 53 kg/m²  PHYSICAL EXAM:      General Appearance:   Alert, cooperative, no distress   HEENT:   Normocephalic, atraumatic, anicteric      Neck:  Supple, symmetrical, trachea midline   Lungs:   Clear to auscultation bilaterally; no rales, rhonchi or wheezing; respirations unlabored    Heart[de-identified]   Regular rate and rhythm; no murmur, rub, or gallop  Abdomen:   Soft, non-tender, non-distended; normal bowel sounds; no masses, no organomegaly    Genitalia:   Deferred    Rectal:   Deferred    Extremities:  No cyanosis, clubbing or edema    Pulses:  2+ and symmetric    Skin:  No jaundice, rashes, or lesions    Lymph nodes:  No palpable cervical lymphadenopathy        Lab Results:   No visits with results within 1 Day(s) from this visit     Latest known visit with results is:   Hospital Outpatient Visit on 01/27/2021   Component Date Value    EXT Preg Test, Ur 01/27/2021 Negative     Control 01/27/2021 Valid     Case Report 01/27/2021                      Value:Surgical Pathology Report                         Case: [id number]                                   Authorizing Provider:  Oliver Chavez MD   Collected:           01/27/2021 1051              Ordering Location:      [facility]       Received:            01/27/2021 [facility]                                                             Pathologist:           Marcio Kapoor MD                                                                 Specimen:    Colon, rectosigmoid                                                                        Addendum 01/27/2021                      Value: This result contains rich text formatting which cannot be displayed here   Final Diagnosis 01/27/2021                      Value: This result contains rich text formatting which cannot be displayed here   Additional Information 01/27/2021                      Value: This result contains rich text formatting which cannot be displayed here  [facility] Description 01/27/2021                      Value: This result contains rich text formatting which cannot be displayed here   Clinical Information 01/27/2021                      Value:Cold bx r/o colitis         Radiology Results:   No results found

## 2022-11-19 DIAGNOSIS — K51.811 OTHER ULCERATIVE COLITIS WITH RECTAL BLEEDING (HCC): ICD-10-CM

## 2022-11-19 RX ORDER — MESALAMINE 1.2 G/1
TABLET, DELAYED RELEASE ORAL
Qty: 100 TABLET | Refills: 1 | Status: SHIPPED | OUTPATIENT
Start: 2022-11-19

## 2023-02-07 ENCOUNTER — OFFICE VISIT (OUTPATIENT)
Dept: URGENT CARE | Facility: CLINIC | Age: 23
End: 2023-02-07

## 2023-02-07 VITALS
WEIGHT: 208.5 LBS | BODY MASS INDEX: 35.79 KG/M2 | DIASTOLIC BLOOD PRESSURE: 90 MMHG | RESPIRATION RATE: 14 BRPM | HEART RATE: 109 BPM | OXYGEN SATURATION: 100 % | SYSTOLIC BLOOD PRESSURE: 128 MMHG | TEMPERATURE: 97.6 F

## 2023-02-07 DIAGNOSIS — J02.9 ACUTE PHARYNGITIS, UNSPECIFIED ETIOLOGY: ICD-10-CM

## 2023-02-07 DIAGNOSIS — B97.89 VIRAL SINUSITIS: Primary | ICD-10-CM

## 2023-02-07 DIAGNOSIS — J32.9 VIRAL SINUSITIS: Primary | ICD-10-CM

## 2023-02-07 PROBLEM — E66.9 OBESITY (BMI 30.0-34.9): Status: ACTIVE | Noted: 2022-03-23

## 2023-02-07 PROBLEM — E66.811 OBESITY (BMI 30.0-34.9): Status: ACTIVE | Noted: 2022-03-23

## 2023-02-07 PROBLEM — J45.20 MILD INTERMITTENT ASTHMA WITHOUT COMPLICATION: Status: ACTIVE | Noted: 2019-01-08

## 2023-02-07 LAB — S PYO AG THROAT QL: NEGATIVE

## 2023-02-07 NOTE — PROGRESS NOTES
[facility] [facility]        NAME: Bhumi Guevara is a 20 y o  female  : 2000    MRN: [id number]  DATE: 2023  TIME: 2:47 PM    Assessment and Plan   Viral sinusitis [J32 9, B97 89]  1  Viral sinusitis        2  Acute pharyngitis, unspecified etiology            Symptoms consistent with viral illness  POC strep is negative will sent for culture  Given education on supportive measures for symptoms in discharge instructions  Follow up with primary care in 3-5 days  Go to ER if symptoms get worse  Patient Instructions     Only 0 5-2% of all infections are bacterial, the rest are viral  These resolve in 7-10 days  Continue supportive care for symptoms such as saline nasal solution or flonase nasal spray to reduce congestion  Warm compresses can be applied to your face to reduce pain  Tylenol and NSAID medications may also be taken  Your throat swab will be sent for culture  --You should contact your primary care provider and/or go to the ER if your symptoms are not improved or get worse over the next 7 days  This includes new onset fever, localized ear pain, severe sinus pain, as well as worsening cough, chest pain, shortness of breath, or significant weakness/fatigue  Chief Complaint     Chief Complaint   Patient presents with   • Sinusitis     Started yesterday, sinus pressure getting worse  Post nasal drip, runny/stuffy nose  Slight cough  No fever  Taking allergy meds  Declines PCR at this time  History of Present Illness       Presents with sick symptoms including post nasal drip, sinus pressure runny/stuffy nose, and slight cough that began yesterday  Denies fever symptoms  Review of Systems   Review of Systems   Constitutional: Negative for chills, fatigue and fever  HENT: Positive for congestion, sinus pressure and sore throat  Respiratory: Positive for cough  Negative for shortness of breath and wheezing  Cardiovascular: Negative for chest pain  Gastrointestinal: Negative for abdominal pain  Genitourinary: Negative for dysuria  Musculoskeletal: Negative for myalgias  Neurological: Negative for dizziness  Psychiatric/Behavioral: Negative for confusion           Current Medications       Current Outpatient Medications:   •  albuterol (PROVENTIL HFA,VENTOLIN HFA) 90 mcg/act inhaler, , Disp: , Rfl:   •  ibuprofen (MOTRIN) 200 mg tablet, Take 200 mg by mouth every 6 (six) hours as needed for mild pain, Disp: , Rfl:   •  Loratadine 10 MG CAPS, Take by mouth, Disp: , Rfl:   •  mesalamine (LIALDA) 1 2 g EC tablet, TAKE 2 TABLETS BY MOUTH DAILY TAKE 4 TABLETS ONCE DAILY FOR 8 WEEKS THEN DECREASE TO 2 TABLETS DAILY, Disp: 100 tablet, Rfl: 1  •  multivitamin (THERAGRAN) TABS, Take 1 tablet by mouth in the morning , Disp: , Rfl:   •  ergocalciferol (VITAMIN D2) 50,000 units, TAKE 1 CAPSULE BY MOUTH ONE TIME PER WEEK (Patient not taking: Reported on 2/7/2023), Disp: , Rfl:     Current Allergies     Allergies as of 02/07/2023 - Reviewed 02/07/2023   Allergen Reaction Noted   • [place] (diagnostic) - food allergy Itching 07/25/2018   • Apple - food allergy Other (See Comments) 07/25/2018   • Kiwi extract - food allergy Other (See Comments) 07/25/2018   • [place] flavor - food allergy Other (See Comments) 07/25/2018   • [place] Other (See Comments) 07/25/2018   • Pollen extract Wheezing 03/23/2022            The following portions of the patient's history were reviewed and updated as appropriate: allergies, current medications, past family history, past medical history, past social history, past surgical history and problem list      Past Medical History:   Diagnosis Date   • Asthma    • Obesity (BMI 35 0-39 9 without comorbidity)    • Ulcerative colitis ([facility] )        Past Surgical History:   Procedure Laterality Date   • NO PAST SURGERIES         Family History   Problem Relation Age of Onset   • Crohn's disease Mother    • Asthma Mother    • Asthma Father    • Colon cancer Paternal Aunt          Medications have been verified  Objective   /90   Pulse (!) 109   Temp 97 6 °F (36 4 °C)   Resp 14   Wt 94 6 kg (208 lb 8 oz)   SpO2 100%   BMI 35 79 kg/m²        Physical Exam     Physical Exam  Vitals reviewed  Constitutional:       General: She is not in acute distress  Appearance: Normal appearance  HENT:      Right Ear: Tympanic membrane, ear canal and external ear normal       Left Ear: Tympanic membrane, ear canal and external ear normal       Nose: Nose normal       Mouth/Throat:      Mouth: Mucous membranes are moist       Pharynx: No posterior oropharyngeal erythema  Eyes:      Conjunctiva/sclera: Conjunctivae normal    Cardiovascular:      Rate and Rhythm: Normal rate and regular rhythm  Pulses: Normal pulses  Heart sounds: Normal heart sounds  No murmur heard  Pulmonary:      Effort: Pulmonary effort is normal  No respiratory distress  Breath sounds: Normal breath sounds  Skin:     General: Skin is warm and dry  Neurological:      General: No focal deficit present  Mental Status: She is alert and oriented to person, place, and time     Psychiatric:         Mood and Affect: Mood normal          Behavior: Behavior normal

## 2023-02-07 NOTE — PATIENT INSTRUCTIONS
Only 0 5-2% of all infections are bacterial, the rest are viral  These resolve in 7-10 days  Continue supportive care for symptoms such as saline nasal solution or flonase nasal spray to reduce congestion  Warm compresses can be applied to your face to reduce pain  Tylenol and NSAID medications may also be taken  Your throat swab will be sent for culture  --You should contact your primary care provider and/or go to the ER if your symptoms are not improved or get worse over the next 7 days  This includes new onset fever, localized ear pain, severe sinus pain, as well as worsening cough, chest pain, shortness of breath, or significant weakness/fatigue

## 2023-02-09 ENCOUNTER — TELEPHONE (OUTPATIENT)
Dept: URGENT CARE | Facility: CLINIC | Age: 23
End: 2023-02-09

## 2023-02-09 DIAGNOSIS — J02.0 STREP THROAT: Primary | ICD-10-CM

## 2023-02-09 LAB — BACTERIA THROAT CULT: ABNORMAL

## 2023-02-09 RX ORDER — AMOXICILLIN 500 MG/1
500 CAPSULE ORAL EVERY 12 HOURS SCHEDULED
Qty: 20 CAPSULE | Refills: 0 | Status: SHIPPED | OUTPATIENT
Start: 2023-02-09 | End: 2023-02-19

## 2023-02-10 NOTE — RESULT ENCOUNTER NOTE
Your throat culture grew a strep C  This strep will go away on its own and does not cause complication  If your sore throat symptoms are gone, no need for additional intervention  If you still have symptoms, give the office a call [phone] and we can discuss medication options  I hope you are feeling better!

## 2023-05-02 ENCOUNTER — RA CDI HCC (OUTPATIENT)
Dept: OTHER | Facility: HOSPITAL | Age: 23
End: 2023-05-02

## 2023-05-02 NOTE — PROGRESS NOTES
[facility]  coding opportunities          Chart Reviewed number of suggestions sent to Provider: 1  [phone]     Patients Insurance        Commercial Insurance: [employer]

## 2023-05-10 ENCOUNTER — APPOINTMENT (OUTPATIENT)
Dept: LAB | Facility: HOSPITAL | Age: 23
End: 2023-05-10
Attending: INTERNAL MEDICINE

## 2023-05-10 ENCOUNTER — OFFICE VISIT (OUTPATIENT)
Dept: GASTROENTEROLOGY | Facility: CLINIC | Age: 23
End: 2023-05-10

## 2023-05-10 VITALS
SYSTOLIC BLOOD PRESSURE: 127 MMHG | DIASTOLIC BLOOD PRESSURE: 84 MMHG | OXYGEN SATURATION: 100 % | HEIGHT: 64 IN | BODY MASS INDEX: 35.51 KG/M2 | WEIGHT: 208 LBS | HEART RATE: 68 BPM

## 2023-05-10 DIAGNOSIS — K51.811 OTHER ULCERATIVE COLITIS WITH RECTAL BLEEDING (HCC): Primary | ICD-10-CM

## 2023-05-10 DIAGNOSIS — K51.811 OTHER ULCERATIVE COLITIS WITH RECTAL BLEEDING (HCC): ICD-10-CM

## 2023-05-10 LAB
CRP SERPL QL: 4.9 MG/L
ERYTHROCYTE [SEDIMENTATION RATE] IN BLOOD: 56 MM/HOUR (ref 0–19)

## 2023-05-10 RX ORDER — PREDNISONE 10 MG/1
TABLET ORAL
Qty: 70 TABLET | Refills: 0 | Status: SHIPPED | OUTPATIENT
Start: 2023-05-10 | End: 2023-06-07

## 2023-05-10 RX ORDER — MESALAMINE 1.2 G/1
1200 TABLET, DELAYED RELEASE ORAL 4 TIMES DAILY
Qty: 124 TABLET | Refills: 3 | Status: SHIPPED | OUTPATIENT
Start: 2023-05-10

## 2023-05-10 NOTE — PROGRESS NOTES
[facility] - Outpatient Follow-up Note  Freda Gonzalez 24 y o  female MRN: [id number]  Encounter: [id number]          ASSESSMENT AND PLAN:      1  Other ulcerative colitis with rectal bleeding (HCC)  - Sedimentation rate, automated; Future  - C-reactive protein; Future  - mesalamine (LIALDA) 1 2 g EC tablet; Take 1 tablet (1 2 g total) by mouth 4 (four) times a day  Dispense: 124 tablet; Refill: 3  - predniSONE 10 mg tablet; Take 4 tablets (40 mg total) by mouth daily for 7 days, THEN 3 tablets (30 mg total) daily for 7 days, THEN 2 tablets (20 mg total) daily for 7 days, THEN 1 tablet (10 mg total) daily for 7 days  Dispense: 70 tablet; Refill: 0    Schedule colonoscopy    F/u one month with Stefania    ______________________________________________________________________    SUBJECTIVE: Coby comes to the office today for evaluation of a flare of her ulcerative colitis  She has been experiencing episodes of abdominal pain associated with diarrhea  The abdominal pain occurs when she has the urge to go and the abdominal pain resolves with a diarrhea episode  She is having up to 2-3 episodes of diarrhea mostly with bleeding per day  The symptoms have been noticeable for the for the past month  She has been taking Lialda 1 2 g 2 tablets in the morning time  She states that since there were times when she was not always taking this medication but she is now taking it routinely  Her last colonoscopy was performed January 27, 2021 by Dr Judy Adames and it demonstrated left-sided colitis from the descending colon down to the rectum  Her most recent CBC in April showed a hemoglobin level that was decreased to 8 8, WBC 6 2, platelet 233  REVIEW OF SYSTEMS IS OTHERWISE NEGATIVE        Historical Information   Past Medical History:   Diagnosis Date   • Anemia    • Asthma    • Obesity (BMI 35 0-39 9 without comorbidity)    • Ulcerative colitis [facility])      Past Surgical History:   Procedure "Laterality Date   • NO PAST SURGERIES       Social History   Social History     Substance and Sexual Activity   Alcohol Use Yes    Comment: occasioanlly     Social History     Substance and Sexual Activity   Drug Use No     Social History     Tobacco Use   Smoking Status Never   • Passive exposure: Never   Smokeless Tobacco Never     Family History   Problem Relation Age of Onset   • Crohn's disease Mother    • Asthma Mother    • Asthma Father    • Colon cancer Paternal Aunt        Meds/Allergies       Current Outpatient Medications:   •  albuterol (PROVENTIL HFA,VENTOLIN HFA) 90 mcg/act inhaler  •  EPINEPHrine (EPIPEN) 0 3 mg/0 3 mL SOAJ  •  ferrous sulfate 325 (65 Fe) mg tablet  •  hydrocortisone (ANUSOL-HC) 25 mg suppository  •  mesalamine (LIALDA) 1 2 g EC tablet  •  mesalamine (LIALDA) 1 2 g EC tablet  •  norethindrone-ethinyl estradiol (JUNEL FE 1/20) 1-20 MG-MCG per tablet  •  predniSONE 10 mg tablet    Allergies   Allergen Reactions   • [place] (Diagnostic) - Food Allergy Itching     Itchy throat and ears,  Throat swelling  • Apple - Food Allergy Other (See Comments)     Fresh only   • Kiwi Extract - Food Allergy Other (See Comments)   • Efrem Flavor - Food Allergy Other (See Comments)   • Punica Other (See Comments)   • Pollen Extract Wheezing           Objective     Blood pressure 127/84, pulse 68, height 5' 4\" (1 626 m), weight 94 3 kg (208 lb), SpO2 100 %  Body mass index is 35 7 kg/m²  PHYSICAL EXAM:      General Appearance:   Alert, cooperative, no distress   HEENT:   Normocephalic, atraumatic, anicteric      Neck:  Supple, symmetrical, trachea midline   Lungs:   Clear to auscultation bilaterally; no rales, rhonchi or wheezing; respirations unlabored    Heart[de-identified]   Regular rate and rhythm; no murmur, rub, or gallop     Abdomen:   Soft, non-tender, non-distended; normal bowel sounds; no masses, no organomegaly    Genitalia:   Deferred    Rectal:   Deferred    Extremities:  No cyanosis, clubbing or " edema    Pulses:  2+ and symmetric    Skin:  No jaundice, rashes, or lesions    Lymph nodes:  No palpable cervical lymphadenopathy        Lab Results:   No visits with results within 1 Day(s) from this visit  Latest known visit with results is:   Office Visit on 02/07/2023   Component Date Value   •  RAPID STREP A 02/07/2023 Negative    • Throat Culture 02/07/2023 2+ Growth of Beta Hemolytic Streptococcus Group C (A)          Radiology Results:   No results found

## 2023-05-11 ENCOUNTER — TELEPHONE (OUTPATIENT)
Dept: GASTROENTEROLOGY | Facility: CLINIC | Age: 23
End: 2023-05-11

## 2023-05-11 NOTE — TELEPHONE ENCOUNTER
----- Message from Lina Patrick DO sent at 5/11/2023  7:15 AM EDT -----  Please call the patient and make her aware that her C-reactive protein is elevated at 4 9 and her sedimentation rate is also elevated at 56

## 2023-05-11 NOTE — TELEPHONE ENCOUNTER
L/M for patient to call back to schedule a Colonoscopy Dr Srinath Bradford ordered at her [address] 5/10/23  She has been a Dr Posey/Stefania patient ?

## 2023-05-12 ENCOUNTER — PREP FOR PROCEDURE (OUTPATIENT)
Dept: GASTROENTEROLOGY | Facility: CLINIC | Age: 23
End: 2023-05-12

## 2023-05-12 DIAGNOSIS — K51.811 OTHER ULCERATIVE COLITIS WITH RECTAL BLEEDING (HCC): Primary | ICD-10-CM

## 2023-05-12 NOTE — TELEPHONE ENCOUNTER
Called and scheduled patients colonoscopy with Dr Nikhil Borges - she only saw Dr Jannie Phan because she missed her appt with karen-My Charted prep Instructions  Scheduled date of colonoscopy (as of today):8/11/23  Physician performing colonoscopy: Kalpesh  Location of colonoscopy:[facility]  Bowel prep reviewed with patient:Dulco/miralax  Instructions reviewed with patient by:Evan nieves  Clearances:  none

## 2023-05-12 NOTE — TELEPHONE ENCOUNTER
Patients GI provider:  Dr Prosper Perez    Number to return call: [phone]    Reason for call: Pt returning Veda's call to schedule colonoscopy      Scheduled procedure/appointment date if applicable: Apt 0/8/28

## 2023-06-04 DIAGNOSIS — K51.811 OTHER ULCERATIVE COLITIS WITH RECTAL BLEEDING (HCC): ICD-10-CM

## 2023-06-05 RX ORDER — MESALAMINE 1.2 G/1
1200 TABLET, DELAYED RELEASE ORAL 4 TIMES DAILY
Qty: 372 TABLET | Refills: 2 | Status: SHIPPED | OUTPATIENT
Start: 2023-06-05

## 2023-06-27 ENCOUNTER — OFFICE VISIT (OUTPATIENT)
Dept: GASTROENTEROLOGY | Facility: CLINIC | Age: 23
End: 2023-06-27
Payer: COMMERCIAL

## 2023-06-27 VITALS
OXYGEN SATURATION: 97 % | SYSTOLIC BLOOD PRESSURE: 115 MMHG | DIASTOLIC BLOOD PRESSURE: 82 MMHG | HEIGHT: 64 IN | BODY MASS INDEX: 35.68 KG/M2 | WEIGHT: 209 LBS | HEART RATE: 73 BPM

## 2023-06-27 DIAGNOSIS — K51.811 OTHER ULCERATIVE COLITIS WITH RECTAL BLEEDING (HCC): Primary | ICD-10-CM

## 2023-06-27 PROCEDURE — 99213 OFFICE O/P EST LOW 20 MIN: CPT | Performed by: PHYSICIAN ASSISTANT

## 2023-06-27 RX ORDER — MESALAMINE 4 G/60ML
4 ENEMA RECTAL
Qty: 42 ENEMA | Refills: 0 | Status: SHIPPED | OUTPATIENT
Start: 2023-06-27

## 2023-06-27 NOTE — PROGRESS NOTES
[facility] - Outpatient Follow-up Note  March Lily 24 y o  female MRN: [id number]  Encounter: [id number]          ASSESSMENT AND PLAN:      1  Other ulcerative colitis with rectal bleeding ([facility] [address] )  - She has ulcerative colitis in the descending and sigmoid colon diagnosed in 2021 who has been in remission until May when she was in a flare up with GUALBERTO and a Hb of 8 8 and bloody BMs up to 6x daily; much improved after 1 month of prednisone and increase Lialda to 4 8 g daily  - We will continue Lialda 4 8 g daily but add 6 weeks of Rowasa enemas since she still has frequency and abdominal cramping compared to her baseline  - Colonoscopy as scheduled in August to reassess disease severity      ______________________________________________________________________    SUBJECTIVE:  Esthela Pop is a 25 yo F presenting for follow-up after she was seen in May for a flare of her ulcerative colitis  She was having lower abdominal cramping with diarrhea up to 6 times a day with blood in most of her bowel movements  Her hemoglobin also was down to 8 8 in April from iron deficiency which is likely combination of her colitis flareup and heavy periods  She did receive iron infusions for several weeks and her hemoglobin is back up to 12 4  After her last visit she was given a month-long prednisone taper and her Lialda was increased to 4 tablets daily as opposed to she reports that she is doing much better  The blood resolved pretty quickly with the steroid course  She still is having some lower abdominal cramping at times as well as 4-5 bowel movements a day which is more frequent than her baseline  She is scheduled for a colonoscopy in the beginning of August       REVIEW OF SYSTEMS IS OTHERWISE NEGATIVE        Historical Information   Past Medical History:   Diagnosis Date   • Anemia    • Asthma    • Obesity (BMI 35 0-39 9 without comorbidity)    • Ulcerative colitis [facility])      Past Surgical History: "  Procedure Laterality Date   • NO PAST SURGERIES       Social History   Social History     Substance and Sexual Activity   Alcohol Use Yes    Comment: occasioanlly     Social History     Substance and Sexual Activity   Drug Use No     Social History     Tobacco Use   Smoking Status Never   • Passive exposure: Never   Smokeless Tobacco Never     Family History   Problem Relation Age of Onset   • Crohn's disease Mother    • Asthma Mother    • Asthma Father    • Colon cancer Paternal Aunt        Meds/Allergies       Current Outpatient Medications:   •  albuterol (PROVENTIL HFA,VENTOLIN HFA) 90 mcg/act inhaler  •  EPINEPHrine (EPIPEN) 0 3 mg/0 3 mL SOAJ  •  mesalamine (LIALDA) 1 2 g EC tablet  •  mesalamine (LIALDA) 1 2 g EC tablet  •  mesalamine (ROWASA) 4 g  •  norethindrone-ethinyl estradiol (JUNEL FE 1/20) 1-20 MG-MCG per tablet    Allergies   Allergen Reactions   • [place] (Diagnostic) - Food Allergy Itching     Itchy throat and ears,  Throat swelling  • Apple - Food Allergy Other (See Comments)     Fresh only   • Kiwi Extract - Food Allergy Other (See Comments)   • [place] Flavor - Food Allergy Other (See Comments)   • Punica Other (See Comments)   • Pollen Extract Wheezing           Objective     Blood pressure 115/82, pulse 73, height 5' 4\" (1 626 m), weight 94 8 kg (209 lb), SpO2 97 %  Body mass index is 35 87 kg/m²  PHYSICAL EXAM:      General Appearance:   Alert, cooperative, no distress   HEENT:   Normocephalic, atraumatic, anicteric      Neck:  Supple, symmetrical, trachea midline   Lungs:   Clear to auscultation bilaterally; no rales, rhonchi or wheezing; respirations unlabored    Heart[de-identified]   Regular rate and rhythm; no murmur, rub, or gallop     Abdomen:   Soft, non-tender, non-distended; normal bowel sounds; no masses, no organomegaly    Genitalia:   Deferred    Rectal:   Deferred    Extremities:  No cyanosis, clubbing or edema    Pulses:  2+ and symmetric    Skin:  No jaundice, rashes, or lesions  " Lymph nodes:  No palpable cervical lymphadenopathy        Lab Results:   No visits with results within 1 Day(s) from this visit  Latest known visit with results is:   Appointment on 05/10/2023   Component Date Value   • Sed Rate 05/10/2023 56 (H)    • CRP 05/10/2023 4 9 (H)          Radiology Results:   US TRANSVAGINAL    Result Date: 6/20/2023  Narrative: History: R10 2  Pelvic pain  N92 0  Menorrhagia with regular cycle  Study: Transabdominal and transvaginal ultrasonography of the pelvis  COMPARISON: None Comment: Transabdominal ultrasonography was performed  Transvaginal scanning was necessary to better visualize the pelvic structures  There is a dominant simple cyst consistent with a follicle right ovary 1 1 x 1 1 x 0 9 cm  The ovaries are normal in size and bilaterally contain more than 5 peripheral subcentimeter cysts consistent with follicles or cysts of PCO S  Right ovary 3 5 x 1 7 x 2 6 cm  Volume 8 mL  Left ovary 1 7 x 2 9 x 2 5 cm  Volume 6 mL  Bilateral positive ovarian blood flow  Normal uterus 6 5 x 3 0 x 3 2 cm  No fibroid  Normal 0 5 cm endometrium  No pelvic mass, focal inflammatory process or fluid  Impression: IMPRESSION: 1  Normal uterus and endometrium  2  1 1 cm right ovarian follicle  Additional smaller bilateral cysts follicles or PCO S  [id number]    US PELVIS    Result Date: 6/20/2023  Narrative: History: R10 2  Pelvic pain  N92 0  Menorrhagia with regular cycle  Study: Transabdominal and transvaginal ultrasonography of the pelvis  COMPARISON: None Comment: Transabdominal ultrasonography was performed  Transvaginal scanning was necessary to better visualize the pelvic structures  There is a dominant simple cyst consistent with a follicle right ovary 1 1 x 1 1 x 0 9 cm  The ovaries are normal in size and bilaterally contain more than 5 peripheral subcentimeter cysts consistent with follicles or cysts of PCO S  Right ovary 3 5 x 1 7 x 2 6 cm  Volume 8 mL   Left ovary 1 7 x 2 9 x 2 5 cm  Volume 6 mL  Bilateral positive ovarian blood flow  Normal uterus 6 5 x 3 0 x 3 2 cm  No fibroid  Normal 0 5 cm endometrium  No pelvic mass, focal inflammatory process or fluid  Impression: IMPRESSION: 1  Normal uterus and endometrium  2  1 1 cm right ovarian follicle  Additional smaller bilateral cysts follicles or PCO S  [id number]    US ABDOMEN COMPLETE    Result Date: 6/19/2023  Narrative: Clinical History: Drug intolerance [Z78 9 (ICD-10-CM)]; Iron deficiency anemia due to chronic blood loss [D50 0 (ICD-10-CM)]; Iron malabsorption [K90 9 (ICD-10-CM)]; Ulcerative pancolitis with other complication (HCC) [S86 372 (ICD-10-CM)] Comparison: No previous abdominal ultrasound  Comment: Ultrasound of the abdomen was performed  Pancreas: The visualized portions of the pancreatic head and body are unremarkable  The pancreatic tail is obscured by bowel gas  Gallbladder/bile ducts: The gallbladder is normal in appearance  The patient was not focally tender over the gallbladder on examination  There is no evidence of biliary ductal dilatation with the extrahepatic bile duct measuring 5 mm in diameter  Liver: Normal in size and echogenicity measuring 15 9 cm craniocaudally  There are no focal hepatic lesions appreciated  Kidneys: Survey views of the right and left kidney show no hydronephrosis  IVC/aorta: Grossly normal  No evidence of abdominal aortic aneurysm  Spleen: Normal in size measuring 9 9 cm in length  Impression: IMPRESSION: Poor visualization of portions of the pancreas due to bowel gas  Otherwise, unremarkable abdominal ultrasound  Workstation:[employer]    XR CHEST 2 VIEWS (PA AND LAT)    Result Date: 6/5/2023  Narrative: History: Acute cough  Findings: Frontal and lateral radiographs of the chest are reviewed  No confluent pneumonia or pulmonary edema  Cardiomediastinal contours are within normal limits  No pleural effusions  Bony thorax is intact      Impression: Impression: No acute disease   [id number]

## 2023-07-23 DIAGNOSIS — K51.811 OTHER ULCERATIVE COLITIS WITH RECTAL BLEEDING (HCC): ICD-10-CM

## 2023-07-25 RX ORDER — MESALAMINE 4 G/60ML
4 ENEMA RECTAL
Qty: 42 ENEMA | Refills: 0 | Status: SHIPPED | OUTPATIENT
Start: 2023-07-25

## 2023-07-28 DIAGNOSIS — K51.811 OTHER ULCERATIVE COLITIS WITH RECTAL BLEEDING (HCC): ICD-10-CM

## 2023-08-01 RX ORDER — MESALAMINE 1.2 G/1
4800 TABLET, DELAYED RELEASE ORAL DAILY
Qty: 372 TABLET | Refills: 3 | Status: SHIPPED | OUTPATIENT
Start: 2023-08-01

## 2023-08-11 ENCOUNTER — HOSPITAL ENCOUNTER (OUTPATIENT)
Dept: GASTROENTEROLOGY | Facility: HOSPITAL | Age: 23
Setting detail: OUTPATIENT SURGERY
End: 2023-08-11
Attending: INTERNAL MEDICINE
Payer: COMMERCIAL

## 2023-08-11 ENCOUNTER — ANESTHESIA EVENT (OUTPATIENT)
Dept: GASTROENTEROLOGY | Facility: HOSPITAL | Age: 23
End: 2023-08-11

## 2023-08-11 ENCOUNTER — ANESTHESIA (OUTPATIENT)
Dept: GASTROENTEROLOGY | Facility: HOSPITAL | Age: 23
End: 2023-08-11

## 2023-08-11 VITALS
DIASTOLIC BLOOD PRESSURE: 82 MMHG | RESPIRATION RATE: 22 BRPM | TEMPERATURE: 97 F | SYSTOLIC BLOOD PRESSURE: 114 MMHG | WEIGHT: 207.23 LBS | HEIGHT: 64 IN | BODY MASS INDEX: 35.38 KG/M2 | OXYGEN SATURATION: 99 % | HEART RATE: 77 BPM

## 2023-08-11 DIAGNOSIS — K51.811 OTHER ULCERATIVE COLITIS WITH RECTAL BLEEDING (HCC): ICD-10-CM

## 2023-08-11 LAB
EXT PREGNANCY TEST URINE: NEGATIVE
EXT. CONTROL: NORMAL

## 2023-08-11 PROCEDURE — 81025 URINE PREGNANCY TEST: CPT | Performed by: STUDENT IN AN ORGANIZED HEALTH CARE EDUCATION/TRAINING PROGRAM

## 2023-08-11 PROCEDURE — 45385 COLONOSCOPY W/LESION REMOVAL: CPT | Performed by: INTERNAL MEDICINE

## 2023-08-11 PROCEDURE — 88305 TISSUE EXAM BY PATHOLOGIST: CPT | Performed by: STUDENT IN AN ORGANIZED HEALTH CARE EDUCATION/TRAINING PROGRAM

## 2023-08-11 PROCEDURE — 45380 COLONOSCOPY AND BIOPSY: CPT | Performed by: INTERNAL MEDICINE

## 2023-08-11 RX ORDER — PROPOFOL 10 MG/ML
INJECTION, EMULSION INTRAVENOUS AS NEEDED
Status: DISCONTINUED | OUTPATIENT
Start: 2023-08-11 | End: 2023-08-11

## 2023-08-11 RX ORDER — SODIUM CHLORIDE, SODIUM LACTATE, POTASSIUM CHLORIDE, CALCIUM CHLORIDE 600; 310; 30; 20 MG/100ML; MG/100ML; MG/100ML; MG/100ML
INJECTION, SOLUTION INTRAVENOUS CONTINUOUS PRN
Status: DISCONTINUED | OUTPATIENT
Start: 2023-08-11 | End: 2023-08-11

## 2023-08-11 RX ORDER — LIDOCAINE HYDROCHLORIDE 20 MG/ML
INJECTION, SOLUTION EPIDURAL; INFILTRATION; INTRACAUDAL; PERINEURAL AS NEEDED
Status: DISCONTINUED | OUTPATIENT
Start: 2023-08-11 | End: 2023-08-11

## 2023-08-11 RX ADMIN — PROPOFOL 50 MG: 10 INJECTION, EMULSION INTRAVENOUS at 14:16

## 2023-08-11 RX ADMIN — PROPOFOL 50 MG: 10 INJECTION, EMULSION INTRAVENOUS at 14:11

## 2023-08-11 RX ADMIN — PROPOFOL 100 MG: 10 INJECTION, EMULSION INTRAVENOUS at 14:08

## 2023-08-11 RX ADMIN — SODIUM CHLORIDE, SODIUM LACTATE, POTASSIUM CHLORIDE, AND CALCIUM CHLORIDE: .6; .31; .03; .02 INJECTION, SOLUTION INTRAVENOUS at 14:05

## 2023-08-11 RX ADMIN — PROPOFOL 50 MG: 10 INJECTION, EMULSION INTRAVENOUS at 14:09

## 2023-08-11 RX ADMIN — PROPOFOL 50 MG: 10 INJECTION, EMULSION INTRAVENOUS at 14:14

## 2023-08-11 RX ADMIN — LIDOCAINE HYDROCHLORIDE 80 MG: 20 INJECTION, SOLUTION EPIDURAL; INFILTRATION; INTRACAUDAL; PERINEURAL at 14:08

## 2023-08-11 NOTE — H&P
History and Physical -  [facility]  Palmer Parrish 24 y.o. female MRN: [id number]                  HPI: Palmer Parrish is a 24y.o. year old female who presents for colonoscopy for ulcerative colitis and hematochezia      REVIEW OF SYSTEMS: Per the HPI, and otherwise unremarkable. Historical Information   Past Medical History:   Diagnosis Date   • Anemia    • Asthma    • Obesity (BMI 35.0-39.9 without comorbidity)    • Ulcerative colitis ([address])      Past Surgical History:   Procedure Laterality Date   • COLONOSCOPY       Social History   Social History     Substance and Sexual Activity   Alcohol Use Yes    Comment: occasioanlly     Social History     Substance and Sexual Activity   Drug Use No     Social History     Tobacco Use   Smoking Status Never   • Passive exposure: Never   Smokeless Tobacco Never     Family History   Problem Relation Age of Onset   • Crohn's disease Mother    • Asthma Mother    • Asthma Father    • Colon cancer Paternal Aunt        Meds/Allergies     (Not in a hospital admission)      Allergies   Allergen Reactions   • [place] (Diagnostic) - Food Allergy Itching     Itchy throat and ears,  Throat swelling. • Apple - Food Allergy Other (See Comments)     Fresh only   • Kiwi Extract - Food Allergy Other (See Comments)   • Efrem Flavor - Food Allergy Other (See Comments)   • Punica Other (See Comments)   • Pollen Extract Wheezing       Objective     Blood pressure 131/87, pulse 80, temperature (!) 97 °F (36.1 °C), resp. rate 22, height 5' 4" (1.626 m), weight 94 kg (207 lb 3.7 oz), last menstrual period 08/06/2023, SpO2 99 %.       PHYSICAL EXAM    /87   Pulse 80   Temp (!) 97 °F (36.1 °C)   Resp 22   Ht 5' 4" (1.626 m)   Wt 94 kg (207 lb 3.7 oz)   LMP 08/06/2023 (Approximate)   SpO2 99%   BMI 35.57 kg/m²       Gen: NAD  CV: RRR  CHEST: Clear  ABD: soft, NT/ND  EXT: no edema      ASSESSMENT/PLAN:  This is a 24y.o. year old female here for ulcerative colitis, and she is stable and optimized for her procedure.

## 2023-08-11 NOTE — ANESTHESIA POSTPROCEDURE EVALUATION
Post-Op Assessment Note    CV Status:  Stable    Pain management: adequate     Mental Status:  Alert and awake   Hydration Status:  Euvolemic   PONV Controlled:  Controlled   Airway Patency:  Patent      Post Op Vitals Reviewed: Yes      Staff: CRNA         No notable events documented.     /69 (08/11/23 1421)    Temp     Pulse 75 (08/11/23 1421)   Resp   18   SpO2 98 % (08/11/23 1421)

## 2023-08-11 NOTE — ANESTHESIA PREPROCEDURE EVALUATION
Procedure:  COLONOSCOPY    Relevant Problems   PULMONARY   (+) Mild intermittent asthma without complication      Obesity    Physical Exam    Airway    Mallampati score: II  TM Distance: >3 FB  Neck ROM: full     Dental   No notable dental hx     Cardiovascular  Rhythm: regular,     Pulmonary  Breath sounds clear to auscultation,     Other Findings        Anesthesia Plan  ASA Score- 2     Anesthesia Type- IV sedation with anesthesia with ASA Monitors. Additional Monitors:   Airway Plan:           Plan Factors-    Chart reviewed. Patient summary reviewed. Patient is not a current smoker. Obstructive sleep apnea risk education given perioperatively. Induction- intravenous. Postoperative Plan-     Informed Consent- Anesthetic plan and risks discussed with patient. I personally reviewed this patient with the CRNA. Discussed and agreed on the Anesthesia Plan with the CRNA. Topher Curtis

## 2023-08-15 PROCEDURE — 88305 TISSUE EXAM BY PATHOLOGIST: CPT | Performed by: STUDENT IN AN ORGANIZED HEALTH CARE EDUCATION/TRAINING PROGRAM

## 2024-01-07 ENCOUNTER — OFFICE VISIT (OUTPATIENT)
Dept: URGENT CARE | Facility: CLINIC | Age: 24
End: 2024-01-07
Payer: COMMERCIAL

## 2024-01-07 VITALS
SYSTOLIC BLOOD PRESSURE: 144 MMHG | TEMPERATURE: 97.9 F | HEART RATE: 87 BPM | DIASTOLIC BLOOD PRESSURE: 74 MMHG | RESPIRATION RATE: 18 BRPM | OXYGEN SATURATION: 99 %

## 2024-01-07 DIAGNOSIS — R05.1 ACUTE COUGH: ICD-10-CM

## 2024-01-07 DIAGNOSIS — J02.9 SORE THROAT: Primary | ICD-10-CM

## 2024-01-07 LAB
S PYO AG THROAT QL: NEGATIVE
SARS-COV-2 AG UPPER RESP QL IA: NEGATIVE
VALID CONTROL: NORMAL

## 2024-01-07 PROCEDURE — 87147 CULTURE TYPE IMMUNOLOGIC: CPT

## 2024-01-07 PROCEDURE — 87811 SARS-COV-2 COVID19 W/OPTIC: CPT

## 2024-01-07 PROCEDURE — 87070 CULTURE OTHR SPECIMN AEROBIC: CPT

## 2024-01-07 PROCEDURE — 99213 OFFICE O/P EST LOW 20 MIN: CPT

## 2024-01-07 NOTE — LETTER
January 7, 2024     Patient: Coby Figueredo   YOB: 2000   Date of Visit: 1/7/2024       To Whom it May Concern:    Coby Figueredo was seen in my clinic on 1/7/2024. Please excuse from work on 1/7/24. She may return to work on 1/10/2024 which is her next scheduled shift .    If you have any questions or concerns, please don't hesitate to call.         Sincerely,          SALOMÓN Muhammad        CC: No Recipients

## 2024-01-07 NOTE — PATIENT INSTRUCTIONS
Check my chart for throat culture results.  Over the counter cough medication such as Mucinex or Delsym as needed.   Tylenol and Motrin for throat pain and fever.  Cool liquids, soft foods.  Warm tea with honey.  Salt water gargles.  Throat lozenges, halls, or chloraseptic throat spray as needed.    PCP Follow up  Go to nearest emergency room if difficulty breathing or swallowing occurs.     Pharyngitis   WHAT YOU NEED TO KNOW:   Pharyngitis, or sore throat, is inflammation of the tissues and structures in your pharynx (throat). Pharyngitis is most often caused by bacteria. It may also be caused by a cold or flu virus. Other causes include smoking, allergies, or acid reflux.   DISCHARGE INSTRUCTIONS:   Call 911 for any of the following:   You have trouble breathing or swallowing because your throat is swollen or sore.      Return to the emergency department if:   You are drooling because it hurts too much to swallow.    Your fever is higher than 102?F (39?C) or lasts longer than 3 days.    You are confused.    You taste blood in your throat.    Contact your healthcare provider if:   Your throat pain gets worse.    You have a painful lump in your throat that does not go away after 5 days.    Your symptoms do not improve after 5 days.    You have questions or concerns about your condition or care.    Medicines:  Viral pharyngitis will go away on its own without treatment. Your sore throat should start to feel better in 3 to 5 days for both viral and bacterial infections. You may need any of the following:  Antibiotics  treat a bacterial infection.    NSAIDs , such as ibuprofen, help decrease swelling, pain, and fever. NSAIDs can cause stomach bleeding or kidney problems in certain people. If you take blood thinner medicine, always ask your healthcare provider if NSAIDs are safe for you. Always read the medicine label and follow directions.    Acetaminophen  decreases pain and fever. It is available without a doctor's  order. Ask how much to take and how often to take it. Follow directions. Acetaminophen can cause liver damage if not taken correctly.    Take your medicine as directed.  Contact your healthcare provider if you think your medicine is not helping or if you have side effects. Tell him or her if you are allergic to any medicine. Keep a list of the medicines, vitamins, and herbs you take. Include the amounts, and when and why you take them. Bring the list or the pill bottles to follow-up visits. Carry your medicine list with you in case of an emergency.    Manage your symptoms:   Gargle salt water.  Mix ¼ teaspoon salt in an 8 ounce glass of warm water and gargle. This may help decrease swelling in your throat.    Drink liquids as directed.  You may need to drink more liquids than usual. Liquids may help soothe your throat and prevent dehydration. Ask how much liquid to drink each day and which liquids are best for you.    Use a cool-steam humidifier  to help moisten the air in your room and calm your cough.    Soothe your throat  with cough drops, ice, soft foods, or popsicles.    Prevent the spread of pharyngitis:  Cover your mouth and nose when you cough or sneeze. Do not share food or drinks. Wash your hands often. Use soap and water. If soap and water are unavailable, use an alcohol based hand .   Follow up with your healthcare provider as directed:  Write down your questions so you remember to ask them during your visits.   © Copyright [employer] 2021 Information is for End User's use only and may not be sold, redistributed or otherwise used for commercial purposes. All illustrations and images included in CareNotes® are the copyrighted property of [employer] or [employer]  The above information is an  only. It is not intended as medical advice for individual conditions or treatments. Talk to your doctor, nurse or pharmacist before following any medical regimen to see if it  is safe and effective for you.

## 2024-01-07 NOTE — PROGRESS NOTES
[facility]        NAME: Coby Figueredo is a 23 y.o. female  : 2000    MRN: [id number]  DATE: 2024  TIME: 9:18 AM    Assessment and Plan   Sore throat [J02.9]  1. Sore throat  POCT rapid ANTIGEN strepA    Throat culture      2. Acute cough  Poct Covid 19 Rapid Antigen Test        POCT rapid strep and COVID negative. Will send throat swab for culture.   Work note given.     Patient Instructions     Check my chart for throat culture results.  Over the counter cough medication such as Mucinex or Delsym as needed.   Tylenol and Motrin for throat pain and fever.  Cool liquids, soft foods.  Warm tea with honey.  Salt water gargles.  Throat lozenges, halls, or chloraseptic throat spray as needed.    PCP Follow up  Go to nearest emergency room if difficulty breathing or swallowing occurs.     Chief Complaint     Chief Complaint   Patient presents with    Sore Throat     Sore throat and cough x2 days. Low grade temp Friday night.          History of Present Illness       The patient presents today with complaints of a dry non productive cough, and sore throat x 2 days. On Fri she had a low grade temp. Last night her symptoms seemed to have gotten worse when at work.         Review of Systems   Review of Systems   Constitutional:  Positive for fever. Negative for chills.   HENT:  Positive for sore throat. Negative for congestion, ear pain, postnasal drip, rhinorrhea, sinus pressure and sinus pain.    Respiratory:  Positive for cough. Negative for shortness of breath and wheezing.    Gastrointestinal:  Positive for nausea. Negative for abdominal pain, diarrhea and vomiting.   Musculoskeletal:  Negative for myalgias.   Skin:  Negative for rash.         Current Medications       Current Outpatient Medications:     albuterol (PROVENTIL HFA,VENTOLIN HFA) 90 mcg/act inhaler, , Disp: , Rfl:     EPINEPHrine (EPIPEN) 0.3 mg/0.3 mL SOAJ, INJECT 0.3 ML (0.3 MG TOTAL) INJECT INTO THE MUSCLE ONE TIME FOR 1  DOSE., Disp: , Rfl:     mesalamine (LIALDA) 1.2 g EC tablet, Take 4 tablets (4.8 g total) by mouth in the morning, Disp: 372 tablet, Rfl: 3    mesalamine (LIALDA) 1.2 g EC tablet, Take 2 tablets (2.4 g total) by mouth in the morning (Patient not taking: Reported on 1/7/2024), Disp: 180 tablet, Rfl: 1    mesalamine (ROWASA) 4 g, INSERT 60 ML (4 G TOTAL) INTO THE RECTUM DAILY AT BEDTIME (Patient not taking: Reported on 1/7/2024), Disp: 42 enema, Rfl: 0    norethindrone-ethinyl estradiol (JUNEL FE 1/20) 1-20 MG-MCG per tablet, Take 1 tablet by mouth daily (Patient not taking: Reported on 1/7/2024), Disp: , Rfl:     Current Allergies     Allergies as of 01/07/2024 - Reviewed 01/07/2024   Allergen Reaction Noted    [place] (diagnostic) - food allergy Itching 07/25/2018    Apple - food allergy Other (See Comments) 07/25/2018    Kiwi extract - food allergy Other (See Comments) 07/25/2018    Efrem flavor - food allergy Other (See Comments) 07/25/2018    [place] Other (See Comments) 07/25/2018    Pollen extract Wheezing 03/23/2022            The following portions of the patient's history were reviewed and updated as appropriate: allergies, current medications, past family history, past medical history, past social history, past surgical history and problem list.     Past Medical History:   Diagnosis Date    Anemia     Asthma     Obesity (BMI 35.0-39.9 without comorbidity)     Ulcerative colitis (HCC)        Past Surgical History:   Procedure Laterality Date    COLONOSCOPY         Family History   Problem Relation Age of Onset    Crohn's disease Mother     Asthma Mother     Asthma Father     Colon cancer Paternal Aunt          Medications have been verified.        Objective   /74   Pulse 87   Temp 97.9 °F (36.6 °C)   Resp 18   LMP 12/15/2023 (Approximate)   SpO2 99%        Physical Exam     Physical Exam  Vitals and nursing note reviewed.   Constitutional:       General: She is not in acute distress.      Appearance: Normal appearance. She is not ill-appearing.   HENT:      Head: Normocephalic and atraumatic.      Right Ear: Tympanic membrane, ear canal and external ear normal.      Left Ear: Tympanic membrane, ear canal and external ear normal.      Nose: Nose normal. No congestion or rhinorrhea.      Mouth/Throat:      Lips: Pink.      Mouth: Mucous membranes are moist.      Pharynx: Posterior oropharyngeal erythema present.      Tonsils: Tonsillar exudate (tonsil stone on L side.) present. 1+ on the right. 1+ on the left.   Eyes:      General: Vision grossly intact.      Extraocular Movements: Extraocular movements intact.      Pupils: Pupils are equal, round, and reactive to light.   Cardiovascular:      Rate and Rhythm: Normal rate and regular rhythm.      Heart sounds: Normal heart sounds. No murmur heard.  Pulmonary:      Effort: Pulmonary effort is normal. No respiratory distress.      Breath sounds: Normal breath sounds. No decreased air movement. No decreased breath sounds, wheezing, rhonchi or rales.   Musculoskeletal:         General: Normal range of motion.      Cervical back: Normal range of motion.   Lymphadenopathy:      Cervical: No cervical adenopathy.   Skin:     General: Skin is warm.      Findings: No rash.   Neurological:      Mental Status: She is alert and oriented to person, place, and time.      Motor: Motor function is intact.      Gait: Gait is intact.   Psychiatric:         Attention and Perception: Attention normal.         Mood and Affect: Mood normal.

## 2024-01-10 ENCOUNTER — TELEPHONE (OUTPATIENT)
Dept: URGENT CARE | Facility: CLINIC | Age: 24
End: 2024-01-10

## 2024-01-10 DIAGNOSIS — J02.0 STREP PHARYNGITIS: Primary | ICD-10-CM

## 2024-01-10 LAB — BACTERIA THROAT CULT: NORMAL

## 2024-01-10 RX ORDER — AMOXICILLIN 500 MG/1
500 TABLET, FILM COATED ORAL 2 TIMES DAILY
Qty: 20 TABLET | Refills: 0 | Status: SHIPPED | OUTPATIENT
Start: 2024-01-10 | End: 2024-01-20

## 2024-01-10 NOTE — LETTER
January 10, 2024     Patient: Coby Figueredo  YOB: 2000  Date of Visit: 1/7/2024      To Whom it May Concern:    Coby Figueredo is under my professional care. Coby was seen in my office on 1/7/2024 Coby may return to work on 1/11/2024 .    If you have any questions or concerns, please don't hesitate to call.         Sincerely,          SALMOÓN Muhammad        CC: No Recipients

## 2024-01-10 NOTE — TELEPHONE ENCOUNTER
Patient called stating her throat culture was positive not for group A. Still complains of a sore throat. Will send in antibiotics.

## 2024-01-10 NOTE — TELEPHONE ENCOUNTER
Patient called requesting updated work note as she did not go to work today. Note placed in my chart.

## 2024-07-28 DIAGNOSIS — K51.811 OTHER ULCERATIVE COLITIS WITH RECTAL BLEEDING (HCC): ICD-10-CM

## 2024-07-30 RX ORDER — MESALAMINE 1.2 G/1
4800 TABLET, DELAYED RELEASE ORAL DAILY
Qty: 360 TABLET | Refills: 1 | Status: SHIPPED | OUTPATIENT
Start: 2024-07-30

## 2025-02-21 DIAGNOSIS — K51.811 OTHER ULCERATIVE COLITIS WITH RECTAL BLEEDING (HCC): ICD-10-CM

## 2025-02-24 RX ORDER — MESALAMINE 1.2 G/1
4800 TABLET, DELAYED RELEASE ORAL DAILY
Qty: 360 TABLET | Refills: 0 | Status: SHIPPED | OUTPATIENT
Start: 2025-02-24

## 2025-04-08 ENCOUNTER — OFFICE VISIT (OUTPATIENT)
Dept: GASTROENTEROLOGY | Facility: CLINIC | Age: 25
End: 2025-04-08
Payer: COMMERCIAL

## 2025-04-08 VITALS
RESPIRATION RATE: 18 BRPM | HEIGHT: 65 IN | DIASTOLIC BLOOD PRESSURE: 76 MMHG | SYSTOLIC BLOOD PRESSURE: 118 MMHG | BODY MASS INDEX: 39.99 KG/M2 | WEIGHT: 240 LBS | HEART RATE: 73 BPM | OXYGEN SATURATION: 98 % | TEMPERATURE: 98.3 F

## 2025-04-08 DIAGNOSIS — K51.811 OTHER ULCERATIVE COLITIS WITH RECTAL BLEEDING (HCC): Primary | ICD-10-CM

## 2025-04-08 PROCEDURE — 99213 OFFICE O/P EST LOW 20 MIN: CPT | Performed by: PHYSICIAN ASSISTANT

## 2025-04-08 RX ORDER — CETIRIZINE HYDROCHLORIDE 10 MG/1
TABLET ORAL
COMMUNITY
Start: 2025-01-08

## 2025-04-08 RX ORDER — MESALAMINE 1.2 G/1
2400 TABLET, DELAYED RELEASE ORAL DAILY
Qty: 180 TABLET | Refills: 3 | Status: SHIPPED | OUTPATIENT
Start: 2025-04-08 | End: 2025-07-07

## 2025-04-08 RX ORDER — MECLIZINE HYDROCHLORIDE 25 MG/1
12.5 TABLET ORAL 2 TIMES DAILY PRN
COMMUNITY
Start: 2024-07-30 | End: 2025-07-30

## 2025-04-08 RX ORDER — MAGNESIUM 200 MG
TABLET ORAL
COMMUNITY
Start: 2025-02-04

## 2025-04-08 RX ORDER — FLUTICASONE PROPIONATE 50 MCG
SPRAY, SUSPENSION (ML) NASAL
COMMUNITY
Start: 2025-01-08

## 2025-04-08 RX ORDER — TOPIRAMATE 25 MG/1
TABLET, FILM COATED ORAL
COMMUNITY
Start: 2025-01-20

## 2025-04-08 NOTE — ASSESSMENT & PLAN NOTE
- She has left sided ulcerative colitis who is currently doing very well on Lialda 2.4 g daily with 2-3 soft BMs daily, no abdominal pain,and no blood in the stool  - Colonoscopy in August 2023 showed mild colitis up to 35 cm and an inflammatory polyp was removed  - Continue Lialda 2.4 g daily; she will monitor and let us know of any flare ups  - Colonoscopy due 2028  - Follow up annually  Orders:    mesalamine (LIALDA) 1.2 g EC tablet; Take 2 tablets (2.4 g total) by mouth in the morning

## 2025-04-08 NOTE — PROGRESS NOTES
"Name: Coby Figueredo      : 2000      MRN: [id number]  Encounter Provider: Stefania David PA-C  Encounter Date: 2025   Encounter department: [facility] [place]  :  Assessment & Plan  Other ulcerative colitis with rectal bleeding (HCC)  - She has left sided ulcerative colitis who is currently doing very well on Lialda 2.4 g daily with 2-3 soft BMs daily, no abdominal pain,and no blood in the stool  - Colonoscopy in 2023 showed mild colitis up to 35 cm and an inflammatory polyp was removed  - Continue Lialda 2.4 g daily; she will monitor and let us know of any flare ups  - Colonoscopy due   - Follow up annually  Orders:    mesalamine (LIALDA) 1.2 g EC tablet; Take 2 tablets (2.4 g total) by mouth in the morning        History of Present Illness   HPI  Coby Figueredo is a 24 y.o. female who presents for routine follow-up of ulcerative colitis.  She is doing really well on 2.4 g of Lialda daily.  She had a flareup in May 2023 requiring prednisone, increase mesalamine dose, and mesalamine enemas but since that time she has done really well.  She generally has 2-3 soft bowel movements a day without any abdominal pain or blood in the stool.  She does not have any joint pains or rashes.  She will get occasional symptoms of abdominal cramping or loose her stool that may last a couple hours or a day depending on what she eats and this is mainly dairy related.      Review of Systems       Objective   /76 (BP Location: Left arm, Patient Position: Sitting, Cuff Size: Standard)   Pulse 73   Temp 98.3 °F (36.8 °C) (Tympanic)   Resp 18   Ht 5' 5\" (1.651 m)   Wt 109 kg (240 lb)   SpO2 98%   BMI 39.94 kg/m²      Physical Exam      "